# Patient Record
Sex: FEMALE | Race: WHITE | Employment: PART TIME | URBAN - METROPOLITAN AREA
[De-identification: names, ages, dates, MRNs, and addresses within clinical notes are randomized per-mention and may not be internally consistent; named-entity substitution may affect disease eponyms.]

---

## 2022-02-12 NOTE — PROGRESS NOTES
Subjective:     Chief Complaint   Patient presents with   Hannah larkin a 24 y.o. F.    This very pleasant 41-year-old white female with past medical history of anxiety and depression presents for establishment and an acute complaint of myalgias and generalized fatigue. She says that she had been in her usual state of health until approximately 2 months ago when she started feeling increasingly tired throughout the day, as well as having diffuse body aches. She says that she feels as if she had just worked out, when in fact she has not exercised as much as usual. She also complains of daytime lethargy, despite sleeping 8 hours per night. She says that she \"sleeps deeply\", but nevertheless sleep remains nonrestorative. Denies any snoring, and says that her partner has not noticed any apneic episodes, although she relates that her father has some form of sleep apnea. She relates to depressed mood but says that this feels different than her usual depression, particularly as she has just graduated from college and is waiting to start graduate school. She denies any fevers or chills, and denies any change in her bowel movements, no chest pain or shortness of breath, and no change in her exercise tolerance. She relates to easy fatigability with strenuous physical activity. She denies any suicidal or homicidal ideation but does relate to depressed mood and difficulty with concentration. She says that she falls asleep quite easily, however. She denies any insomnia. She denies any focal weakness, or difficulty getting out of a chair, and denies any change in her urine appearance. She does not smoke, alcohol only socially. She does not take any prescription medications. She denies any recreational drug use or herbal medications. She is sexually active with a male partner, and they use condoms.  Her last menstrual period was approximately 1 month ago, and she says that she typically has a high degree of variability in this. She also notes a prior history of an ovarian cyst, of undetermined clinical significance. She denies any joint pain or swelling. Her review of systems is otherwise unremarkable. Past Medical History:  Past Medical History:   Diagnosis Date    Depression     02/2022: therapy    Fatigue 02/2022    Former smoker, stopped smoking in distant past     Myalgia 02/2022    Ovarian cyst        Past Surgical Histor:  Past Surgical History:   Procedure Laterality Date    HX WISDOM TEETH EXTRACTION         Allergies:  No Known Allergies    Medications:      Social History:  Social History     Socioeconomic History    Marital status: LIFE PARTNER   Tobacco Use    Smoking status: Former Smoker    Smokeless tobacco: Former User   Substance and Sexual Activity    Alcohol use: Yes     Comment: socially    Drug use: Not Currently    Sexual activity: Yes       Family History:  Family History   Problem Relation Age of Onset    Hypertension Mother     Sleep Apnea Father     Kidney cancer Maternal Grandmother     Diabetes Maternal Grandfather        Immunizations:  Immunization History   Administered Date(s) Administered    COVID-19, Moderna Booster, PF, 0.25mL Dose 11/16/2021    COVID-19, Ladene Tamiko, Primary or Immunocompromised Series, MRNA, PF, 100mcg/0.5mL 04/06/2021, 05/06/2021    Influenza Vaccine 10/15/2021    Tdap 02/23/2020        Healthcare Maintenance:  Health Maintenance   Topic Date Due    Hepatitis C Screening  Never done    Depression Screen  Never done    HPV Age 9Y-34Y (1 - 2-dose series) Never done    Pap Smear  Never done    DTaP/Tdap/Td series (2 - Td or Tdap) 02/23/2030    Flu Vaccine  Completed    COVID-19 Vaccine  Completed    Pneumococcal 0-64 years  Aged Out        Review of Systems:  ROS:  Review of Systems   Constitutional: Positive for malaise/fatigue. Negative for chills, diaphoresis, fever and weight loss. HENT: Negative. Eyes: Negative. Respiratory: Negative. Negative for cough and wheezing. Cardiovascular: Negative. Negative for chest pain, palpitations, orthopnea, claudication, leg swelling and PND. Gastrointestinal: Negative. Genitourinary: Negative. Musculoskeletal: Positive for back pain and myalgias. Negative for falls, joint pain and neck pain. Skin: Negative. Neurological: Negative. Endo/Heme/Allergies: Negative. Psychiatric/Behavioral: Negative. ROS otherwise negative      Objective:     Vital Signs:  Visit Vitals  /80 (BP 1 Location: Left upper arm, BP Patient Position: Sitting, BP Cuff Size: Adult)   Pulse 95   Temp 99.2 °F (37.3 °C) (Oral)   Ht 5' 5\" (1.651 m)   Wt 155 lb 3.2 oz (70.4 kg)   SpO2 99%   BMI 25.83 kg/m²       BMI:  Body mass index is 25.83 kg/m². Physical Examination:  Physical Exam  Constitutional:       Appearance: Normal appearance. She is normal weight. HENT:      Head: Normocephalic and atraumatic. Right Ear: External ear normal.      Left Ear: External ear normal.      Nose: Nose normal.      Mouth/Throat:      Mouth: Mucous membranes are moist.      Pharynx: Oropharynx is clear. No oropharyngeal exudate or posterior oropharyngeal erythema. Eyes:      Extraocular Movements: Extraocular movements intact. Pupils: Pupils are equal, round, and reactive to light. Cardiovascular:      Rate and Rhythm: Normal rate and regular rhythm. Pulses: Normal pulses. Heart sounds: Normal heart sounds. No murmur heard. No friction rub. No gallop. Pulmonary:      Effort: Pulmonary effort is normal. No respiratory distress. Breath sounds: Normal breath sounds. No wheezing, rhonchi or rales. Abdominal:      General: Abdomen is flat. Bowel sounds are normal. There is no distension. Palpations: Abdomen is soft. Tenderness: There is no abdominal tenderness. There is no guarding. Musculoskeletal:         General: No swelling, tenderness or deformity. Normal range of motion. Cervical back: Normal range of motion and neck supple. No rigidity or tenderness. Comments: Numerous trigger points positive   Skin:     General: Skin is warm and dry. Findings: No erythema, lesion or rash. Neurological:      General: No focal deficit present. Mental Status: She is alert and oriented to person, place, and time. Mental status is at baseline. Sensory: No sensory deficit. Motor: No weakness. Gait: Gait normal.      Deep Tendon Reflexes: Reflexes normal.   Psychiatric:         Mood and Affect: Mood normal.         Behavior: Behavior normal.         Judgment: Judgment normal.          Physical exam otherwise negative    Diagnostic Testing:    Laboratory Studies:  No results found for any previous visit. Radiographic Studies:  XR Results (most recent):  No results found for this or any previous visit. CARLOS Results (most recent):  No results found for this or any previous visit. CT Results (most recent):  No results found for this or any previous visit. DEXA Results (most recent):  No results found for this or any previous visit. MRI Results (most recent):  No results found for this or any previous visit. Assessment/Plan:       ICD-10-CM ICD-9-CM    1. Routine adult health maintenance  Z00.00 V70.0 HEPATITIS C AB      HEMOGLOBIN A1C WITH EAG      CBC WITH AUTOMATED DIFF      METABOLIC PANEL, COMPREHENSIVE      LIPID PANEL      HIV 1/2 AG/AB, 4TH GENERATION,W RFLX CONFIRM      TSH 3RD GENERATION      VITAMIN D, 25 HYDROXY      VITAMIN B12      CK      SED RATE (ESR)      HCG URINE, QL   2. Screening for viral disease  Z11.59 V73.99 HEPATITIS C AB      HIV 1/2 AG/AB, 4TH GENERATION,W RFLX CONFIRM   3. Myalgia  M79.10 729.1 TSH 3RD GENERATION      VITAMIN D, 25 HYDROXY      VITAMIN B12      CK      SED RATE (ESR)      HCG URINE, QL   4. Depression, unspecified depression type  F32. A 311    5.  Fatigue, unspecified type R53.83 780.79 TSH 3RD GENERATION      VITAMIN D, 25 HYDROXY      VITAMIN B12      CK      SED RATE (ESR)      HCG URINE, QL          This young white female presents with complaints of fatigue and myalgias. There is differential diagnosis for this, but given the presence of positive trigger points on examination and her history of depression/anxiety, I favor fibromyalgia. Less likely would be the possibility of a primary hematologic disorder including anemia, and much less likely leukemia given the lack of systemic symptoms; I also am doubtful for endocrinopathies including hypothyroidism or adrenal insufficiency. Primary rheumatologic disease including rheumatoid arthritis or lupus strike me as incredibly unlikely given the lack of joint symptoms and other associated symptoms. Myositis a consideration though would not be typically associated with myalgias. HIV and other infectious etiologies also strike me as unlikely but could be present and cause these symptoms. Primary cardiovascular disorder also strikes me as unlikely given no change in exercise tolerance and no other focal findings on examination. Certainly, poor sleep from untreated obstructive sleep apnea or other primes order would also be a consideration. I discussed this differential diagnosis with the patient. I also noted to her that if fibromyalgia was in fact the diagnosis then treatment with Cymbalta could be considered, which would have some salutary effect on depression. She will continue with therapy for now, and we will revisit this potential treatment if diagnostic testing ends up being unremarkable. Laboratory studies ordered as noted above. Patient will follow up with me in no more than 3 months for follow-up on her symptoms, and potentially follow-up initiation of pharmacotherapy. Consider referral to rheumatology, sleep medicine, holding off for now.     Patient reports being up-to-date with regard to Pap smear as of a few months ago in 2021, and being up-to-date with regard to Covid vaccination and booster, Tdap, and Gardasil. Reed Winslow MD    Please note that this dictation was completed with [employer], the computer voice recognition software. Quite often unanticipated grammatical, syntax, homophones, and other interpretive errors are inadvertently transcribed by the computer software. Please disregard these errors. Please excuse any errors that have escaped final proofreading.

## 2022-02-23 ENCOUNTER — OFFICE VISIT (OUTPATIENT)
Dept: INTERNAL MEDICINE CLINIC | Age: 22
End: 2022-02-23
Payer: COMMERCIAL

## 2022-02-23 VITALS
OXYGEN SATURATION: 99 % | HEIGHT: 65 IN | TEMPERATURE: 99.2 F | DIASTOLIC BLOOD PRESSURE: 80 MMHG | BODY MASS INDEX: 25.86 KG/M2 | HEART RATE: 95 BPM | WEIGHT: 155.2 LBS | SYSTOLIC BLOOD PRESSURE: 124 MMHG

## 2022-02-23 DIAGNOSIS — Z11.59 SCREENING FOR VIRAL DISEASE: ICD-10-CM

## 2022-02-23 DIAGNOSIS — F32.A DEPRESSION, UNSPECIFIED DEPRESSION TYPE: ICD-10-CM

## 2022-02-23 DIAGNOSIS — Z00.00 ROUTINE ADULT HEALTH MAINTENANCE: Primary | ICD-10-CM

## 2022-02-23 DIAGNOSIS — M79.10 MYALGIA: ICD-10-CM

## 2022-02-23 DIAGNOSIS — R53.83 FATIGUE, UNSPECIFIED TYPE: ICD-10-CM

## 2022-02-23 PROCEDURE — 99204 OFFICE O/P NEW MOD 45 MIN: CPT | Performed by: INTERNAL MEDICINE

## 2022-02-23 NOTE — PATIENT INSTRUCTIONS
Learning About the [facility] Diet  What is the Mediterranean diet? The Mediterranean diet is a style of eating rather than a diet plan. It features foods eaten in [place], [place], [place] and [place], and other countries along the [facility]. It emphasizes eating foods like fish, fruits, vegetables, beans, high-fiber breads and whole grains, nuts, and olive oil. This style of eating includes limited red meat, cheese, and sweets. Why choose the Mediterranean diet? A Mediterranean-style diet may improve heart health. It contains more fat than other heart-healthy diets. But the fats are mainly from nuts, unsaturated oils (such as fish oils and olive oil), and certain nut or seed oils (such as canola, soybean, or flaxseed oil). These fats may help protect the heart and blood vessels. How can you get started on the Mediterranean diet? Here are some things you can do to switch to a more Mediterranean way of eating. What to eat  · Eat a variety of fruits and vegetables each day, such as grapes, blueberries, tomatoes, broccoli, peppers, figs, olives, spinach, eggplant, beans, lentils, and chickpeas. · Eat a variety of whole-grain foods each day, such as oats, brown rice, and whole wheat bread, pasta, and couscous. · Eat fish at least 2 times a week. Try tuna, salmon, mackerel, lake trout, herring, or sardines. · Eat moderate amounts of low-fat dairy products, such as milk, cheese, or yogurt. · Eat moderate amounts of poultry and eggs. · Choose healthy (unsaturated) fats, such as nuts, olive oil, and certain nut or seed oils like canola, soybean, and flaxseed. · Limit unhealthy (saturated) fats, such as butter, palm oil, and coconut oil. And limit fats found in animal products, such as meat and dairy products made with whole milk. Try to eat red meat only a few times a month in very small amounts. · Limit sweets and desserts to only a few times a week.  This includes sugar-sweetened drinks like soda. The Mediterranean diet may also include red wine with your meal1 glass each day for women and up to 2 glasses a day for men. Tips for eating at home  · Use herbs, spices, garlic, lemon zest, and citrus juice instead of salt to add flavor to foods. · Add avocado slices to your sandwich instead of henry. · Have fish for lunch or dinner instead of red meat. Brush the fish with olive oil, and broil or grill it. · Sprinkle your salad with seeds or nuts instead of cheese. · Cook with olive or canola oil instead of butter or oils that are high in saturated fat. · Switch from 2% milk or whole milk to 1% or fat-free milk. · Dip raw vegetables in a vinaigrette dressing or hummus instead of dips made from mayonnaise or sour cream.  · Have a piece of fruit for dessert instead of a piece of cake. Try baked apples, or have some dried fruit. Tips for eating out  · Try broiled, grilled, baked, or poached fish instead of having it fried or breaded. · Ask your  to have your meals prepared with olive oil instead of butter. · Order dishes made with marinara sauce or sauces made from olive oil. Avoid sauces made from cream or mayonnaise. · Choose whole-grain breads, whole wheat pasta and pizza crust, brown rice, beans, and lentils. · Cut back on butter or margarine on bread. Instead, you can dip your bread in a small amount of olive oil. · Ask for a side salad or grilled vegetables instead of french fries or chips. Where can you learn more? Go to http://www.ratliff.com/  Enter O407 in the search box to learn more about \"Learning About the Mediterranean Diet. \"  Current as of: December 17, 2020               Content Version: 13.0  © 2079-3918 [employer] Inc[employer]. Care instructions adapted under license by "[employer]" (which disclaims liability or warranty for this information).  If you have questions about a medical condition or this instruction, always ask your healthcare professional. [address] any warranty or liability for your use of this information.

## 2022-02-23 NOTE — PROGRESS NOTES
Chief Complaint   Patient presents with    Establish Care     Visit Vitals  /80 (BP 1 Location: Left upper arm, BP Patient Position: Sitting, BP Cuff Size: Adult)   Pulse 95   Temp 99.2 °F (37.3 °C) (Oral)   Ht 5' 5\" (1.651 m)   Wt 155 lb 3.2 oz (70.4 kg)   SpO2 99%   BMI 25.83 kg/m²         1. Have you been to the ER, urgent care clinic since your last visit? Hospitalized since your last visit? No    2. Have you seen or consulted any other health care providers outside of the [address] [place] [address] since your last visit? Include any pap smears or colon screening.  No

## 2022-03-02 ENCOUNTER — APPOINTMENT (OUTPATIENT)
Dept: INTERNAL MEDICINE CLINIC | Age: 22
End: 2022-03-02

## 2022-03-02 DIAGNOSIS — Z11.59 SCREENING FOR VIRAL DISEASE: ICD-10-CM

## 2022-03-02 DIAGNOSIS — M79.10 MYALGIA: ICD-10-CM

## 2022-03-02 DIAGNOSIS — Z00.00 ROUTINE ADULT HEALTH MAINTENANCE: ICD-10-CM

## 2022-03-02 DIAGNOSIS — R53.83 FATIGUE, UNSPECIFIED TYPE: ICD-10-CM

## 2022-03-02 LAB
25(OH)D3 SERPL-MCNC: 24.1 NG/ML (ref 30–100)
ALBUMIN SERPL-MCNC: 4.3 G/DL (ref 3.5–5)
ALBUMIN/GLOB SERPL: 1.3 {RATIO} (ref 1.1–2.2)
ALP SERPL-CCNC: 83 U/L (ref 45–117)
ALT SERPL-CCNC: 23 U/L (ref 12–78)
ANION GAP SERPL CALC-SCNC: 4 MMOL/L (ref 5–15)
AST SERPL-CCNC: 24 U/L (ref 15–37)
BASOPHILS # BLD: 0 K/UL (ref 0–0.1)
BASOPHILS NFR BLD: 1 % (ref 0–1)
BILIRUB SERPL-MCNC: 0.6 MG/DL (ref 0.2–1)
BUN SERPL-MCNC: 10 MG/DL (ref 6–20)
BUN/CREAT SERPL: 15 (ref 12–20)
CALCIUM SERPL-MCNC: 9.2 MG/DL (ref 8.5–10.1)
CHLORIDE SERPL-SCNC: 104 MMOL/L (ref 97–108)
CHOLEST SERPL-MCNC: 201 MG/DL
CK SERPL-CCNC: 134 U/L (ref 26–192)
CO2 SERPL-SCNC: 29 MMOL/L (ref 21–32)
CREAT SERPL-MCNC: 0.67 MG/DL (ref 0.55–1.02)
DIFFERENTIAL METHOD BLD: NORMAL
EOSINOPHIL # BLD: 0.1 K/UL (ref 0–0.4)
EOSINOPHIL NFR BLD: 2 % (ref 0–7)
ERYTHROCYTE [DISTWIDTH] IN BLOOD BY AUTOMATED COUNT: 13.2 % (ref 11.5–14.5)
ERYTHROCYTE [SEDIMENTATION RATE] IN BLOOD: 7 MM/HR (ref 0–20)
EST. AVERAGE GLUCOSE BLD GHB EST-MCNC: 85 MG/DL
GLOBULIN SER CALC-MCNC: 3.3 G/DL (ref 2–4)
GLUCOSE SERPL-MCNC: 79 MG/DL (ref 65–100)
HBA1C MFR BLD: 4.6 % (ref 4–5.6)
HCG UR QL: NEGATIVE
HCT VFR BLD AUTO: 42.2 % (ref 35–47)
HCV AB SERPL QL IA: NONREACTIVE
HDLC SERPL-MCNC: 91 MG/DL
HDLC SERPL: 2.2 {RATIO} (ref 0–5)
HGB BLD-MCNC: 13.7 G/DL (ref 11.5–16)
HIV 1+2 AB+HIV1 P24 AG SERPL QL IA: NONREACTIVE
HIV12 RESULT COMMENT, HHIVC: NORMAL
IMM GRANULOCYTES # BLD AUTO: 0 K/UL (ref 0–0.04)
IMM GRANULOCYTES NFR BLD AUTO: 0 % (ref 0–0.5)
LDLC SERPL CALC-MCNC: 101.2 MG/DL (ref 0–100)
LYMPHOCYTES # BLD: 1.6 K/UL (ref 0.8–3.5)
LYMPHOCYTES NFR BLD: 33 % (ref 12–49)
MCH RBC QN AUTO: 30.2 PG (ref 26–34)
MCHC RBC AUTO-ENTMCNC: 32.5 G/DL (ref 30–36.5)
MCV RBC AUTO: 93.2 FL (ref 80–99)
MONOCYTES # BLD: 0.6 K/UL (ref 0–1)
MONOCYTES NFR BLD: 11 % (ref 5–13)
NEUTS SEG # BLD: 2.6 K/UL (ref 1.8–8)
NEUTS SEG NFR BLD: 53 % (ref 32–75)
NRBC # BLD: 0 K/UL (ref 0–0.01)
NRBC BLD-RTO: 0 PER 100 WBC
PLATELET # BLD AUTO: 329 K/UL (ref 150–400)
PMV BLD AUTO: 10.2 FL (ref 8.9–12.9)
POTASSIUM SERPL-SCNC: 4.2 MMOL/L (ref 3.5–5.1)
PROT SERPL-MCNC: 7.6 G/DL (ref 6.4–8.2)
RBC # BLD AUTO: 4.53 M/UL (ref 3.8–5.2)
SODIUM SERPL-SCNC: 137 MMOL/L (ref 136–145)
TRIGL SERPL-MCNC: 44 MG/DL (ref ?–150)
TSH SERPL DL<=0.05 MIU/L-ACNC: 1.37 UIU/ML (ref 0.36–3.74)
VIT B12 SERPL-MCNC: 528 PG/ML (ref 193–986)
VLDLC SERPL CALC-MCNC: 8.8 MG/DL
WBC # BLD AUTO: 5 K/UL (ref 3.6–11)

## 2022-03-03 ENCOUNTER — PATIENT MESSAGE (OUTPATIENT)
Dept: INTERNAL MEDICINE CLINIC | Age: 22
End: 2022-03-03

## 2022-03-03 DIAGNOSIS — F32.A DEPRESSION, UNSPECIFIED DEPRESSION TYPE: ICD-10-CM

## 2022-03-03 DIAGNOSIS — M79.10 MYALGIA: Primary | ICD-10-CM

## 2022-03-03 NOTE — PROGRESS NOTES
Please let this patient know that her labs were generally unremarkable except for her vitamin D level which was slightly low. She can begin over-the-counter supplementation with 1000 to 2000 international units daily.

## 2022-03-07 RX ORDER — DULOXETIN HYDROCHLORIDE 30 MG/1
30 CAPSULE, DELAYED RELEASE ORAL DAILY
Qty: 30 CAPSULE | Refills: 5 | Status: SHIPPED | OUTPATIENT
Start: 2022-03-07 | End: 2022-04-27 | Stop reason: DRUGHIGH

## 2022-03-07 NOTE — TELEPHONE ENCOUNTER
Per discussion, trial low-dose Cymbalta; if tolerated, consider titration to 60 mg/d. Patient has not specified a pharmacy. E-prescription not possible. Paper script provided. Please have patient stop by clinic to pick this up or advise as to which pharmacy she would like this sent to. Thanks!

## 2022-03-07 NOTE — TELEPHONE ENCOUNTER
From: Nallely Laboy  To: [facility]  Sent: 3/3/2022 9:54 AM EST  Subject: Starting Cymbalta (RX request and question)    After receiving no significant lab results, besides slightly low vitamin D, I was wondering if I could possibly begin a regiment for Cymbalta as suggested by Dr. Ham Cates? Also, I wanted to know if it was worth following up with a sleep study in regards to issues raised during my last appt. ?     Thank you,  Erminia Opitz

## 2022-03-07 NOTE — TELEPHONE ENCOUNTER
Paper prescription provided/printed; please have patient  or advise as to pharmacy she would like this sent to.

## 2022-03-19 PROBLEM — R53.83 FATIGUE: Status: ACTIVE | Noted: 2022-02-01

## 2022-03-20 PROBLEM — M79.10 MYALGIA: Status: ACTIVE | Noted: 2022-02-01

## 2022-04-26 ENCOUNTER — PATIENT MESSAGE (OUTPATIENT)
Dept: INTERNAL MEDICINE CLINIC | Age: 22
End: 2022-04-26

## 2022-04-26 DIAGNOSIS — F32.A DEPRESSION, UNSPECIFIED DEPRESSION TYPE: Primary | ICD-10-CM

## 2022-04-27 RX ORDER — DULOXETIN HYDROCHLORIDE 60 MG/1
60 CAPSULE, DELAYED RELEASE ORAL DAILY
Qty: 90 CAPSULE | Refills: 3 | Status: SHIPPED | OUTPATIENT
Start: 2022-04-27 | End: 2022-05-03 | Stop reason: SDUPTHER

## 2022-04-27 NOTE — TELEPHONE ENCOUNTER
Micah Lees Dimitri Deanna,    I agree with you that the 30 mg dose may not be sufficient. We often have to use the 60 mg/d dosing. You can double up on the prescription for now; I will order the higher dose for you. Please continue to monitor for side effects-- though I'm glad to hear it was effective. Please make sure you have a followup set up with me; I look forward to seeing you again in clinic soon! You can  the printed prescription from the  at our clinic. Dr. Nunu Simon ICD-9-CM    1. Depression, unspecified depression type  F32. A 311 DULoxetine (CYMBALTA) 60 mg capsule

## 2022-04-27 NOTE — TELEPHONE ENCOUNTER
From: Eugene Romero  To: [place] [facility]  Sent: 4/26/2022 9:19 PM EDT  Subject: Increase Cymbalta Dose    Good Afternoon,    The 30mg Cymbalta dose was working great up until two weeks ago. However, the previous tiredness and depressive symptoms treated with the Cymbalta have returned. I was wondering if a change in dose would be effective in treating this setback? Thank you!   Caroline Mercado

## 2022-05-03 ENCOUNTER — PATIENT MESSAGE (OUTPATIENT)
Dept: INTERNAL MEDICINE CLINIC | Age: 22
End: 2022-05-03

## 2022-05-03 DIAGNOSIS — F32.A DEPRESSION, UNSPECIFIED DEPRESSION TYPE: ICD-10-CM

## 2022-05-03 RX ORDER — DULOXETIN HYDROCHLORIDE 60 MG/1
60 CAPSULE, DELAYED RELEASE ORAL DAILY
Qty: 90 CAPSULE | Refills: 3 | Status: SHIPPED
Start: 2022-05-03 | End: 2022-09-15 | Stop reason: DRUGHIGH

## 2022-05-03 NOTE — TELEPHONE ENCOUNTER
From: Katie Jose  To: [facility]  Sent: 5/3/2022 1:47 PM EDT  Subject: Refill Prescription    Can I get my new prescription dosage sent/filled at the [address] [facility], [id number]? Thank you!

## 2022-05-12 PROBLEM — M79.7 FIBROMYALGIA: Status: ACTIVE | Noted: 2022-02-01

## 2022-05-12 NOTE — PROGRESS NOTES
Subjective:     Chief Complaint   Patient presents with   Thai Rhoades is a 24 y.o. F.  She is a former smoker and has a past medical history of depression. I had seen the patient most recently in late February for establishment. She had been complaining of feeling increasingly tired throughout the day as well as diffuse body aches. She had related to also depressed mood but said that this felt different than her usual depression. Physical examination at the time was unremarkable. Trigger points were noted on exam.  I had favored the likelihood of fibromyalgia as a diagnosis. I had referred her for routine laboratory testing and started her on a trial of Cymbalta. Today, the patient comes in for routine follow-up. She reports feeling fine overall today except for a head cold, which she has had for about the past 2 to 3 days. She has been using DayQuil for this, although it has not been very helpful. She says that she was tested for COVID at home 3 times, with the tests all being negative. She denies any chest pain, shortness of breath, wheezing, hemoptysis, coughing, or other problems. Her main problems are nasal congestion with postnasal drip, and a low-grade temperature. Denies any significant sinus tenderness or pain. She says that she has quit drinking, and says that overall the duloxetine that is now prescribed at 60 mg daily has been very effective for her. She denies any suicidal homicidal ideation and feels that her mood is significantly improved. She no longer gets as low as she admitted before. She is going to therapy currently, and plans to continue with this. Overall, she feels that her mood and fibromyalgia are much better controlled. She denies any worsening myalgias or other pain symptoms. Her energy level is now back to normal.    Otherwise, she reports feeling fine. She says that she last had her Pap smear done last year around this time.   Her review of systems is otherwise negative. Physical examination is notable for bilateral boggy nares, as well as clear nasal discharge. There is mild submandibular gland enlargement. There is mild oropharyngeal cobblestoning. Lungs are clear to auscultation bilateral.  There is no lower extremity edema. Heart sounds are tachycardic. Past Medical History:  Past Medical History:   Diagnosis Date    Depression     02/2022: therapy    Fatigue 02/2022    Fibromyalgia 02/2022    Former smoker, stopped smoking in distant past     Hyperlipidemia 03/2022    diet controlled, .2 mg/dL    Ovarian cyst     Vitamin D deficiency        Past Surgical Histor:  Past Surgical History:   Procedure Laterality Date    HX WISDOM TEETH EXTRACTION         Allergies:  No Known Allergies    Medications:  Current Outpatient Medications   Medication Sig Dispense Refill    DULoxetine (CYMBALTA) 60 mg capsule Take 1 Capsule by mouth daily for 360 days.  90 Capsule 3       Social History:  Social History     Socioeconomic History    Marital status: LIFE PARTNER   Tobacco Use    Smoking status: Former Smoker    Smokeless tobacco: Former User   Substance and Sexual Activity    Alcohol use: Yes     Comment: socially    Drug use: Not Currently    Sexual activity: Yes       Family History:  Family History   Problem Relation Age of Onset    Hypertension Mother     Sleep Apnea Father     Kidney cancer Maternal Grandmother     Diabetes Maternal Grandfather        Immunizations:  Immunization History   Administered Date(s) Administered    COVID-19, Moderna Booster, PF, 0.25mL Dose 11/16/2021    COVID-19, [place], Primary or Immunocompromised Series, MRNA, PF, 100mcg/0.5mL 04/06/2021, 05/06/2021    Influenza Vaccine 10/15/2021    Tdap 02/23/2020        Healthcare Maintenance:  Health Maintenance   Topic Date Due    HPV Age 9Y-34Y (1 - 2-dose series) Never done    Depression Monitoring  Never done    Pap Smear  05/01/2024  DTaP/Tdap/Td series (2 - Td or Tdap) 02/23/2030    Hepatitis C Screening  Completed    Flu Vaccine  Completed    COVID-19 Vaccine  Completed    Pneumococcal 0-64 years  Aged Out        Review of Systems:  ROS:  Review of Systems   Constitutional: Positive for fever. Negative for chills, diaphoresis, malaise/fatigue and weight loss. HENT: Positive for congestion. Negative for sinus pain and sore throat. Eyes: Negative. Respiratory: Negative. Negative for cough, hemoptysis, sputum production and shortness of breath. Cardiovascular: Negative. Negative for chest pain, palpitations, orthopnea, claudication, leg swelling and PND. Gastrointestinal: Negative. Genitourinary: Negative. Musculoskeletal: Negative. Skin: Negative. Neurological: Negative. Endo/Heme/Allergies: Negative. Psychiatric/Behavioral: Negative. ROS otherwise negative      Objective:     Vital Signs:  Visit Vitals  BP (!) 144/99 (BP 1 Location: Left upper arm, BP Patient Position: Sitting, BP Cuff Size: Adult)   Pulse (!) 121   Temp 99.3 °F (37.4 °C) (Oral)   Ht 5' 5\" (1.651 m)   Wt 158 lb 11.2 oz (72 kg)   SpO2 98%   BMI 26.41 kg/m²       BMI:  Body mass index is 26.41 kg/m². Physical Examination:  Physical Exam  Constitutional:       Appearance: Normal appearance. She is normal weight. HENT:      Head: Normocephalic and atraumatic. Right Ear: External ear normal.      Left Ear: External ear normal.      Nose: Congestion and rhinorrhea present. Mouth/Throat:      Mouth: Mucous membranes are moist.      Pharynx: Oropharynx is clear. No oropharyngeal exudate or posterior oropharyngeal erythema. Cardiovascular:      Rate and Rhythm: Regular rhythm. Tachycardia present. Pulses: Normal pulses. Heart sounds: Normal heart sounds. No murmur heard. No friction rub. No gallop. Pulmonary:      Effort: Pulmonary effort is normal. No respiratory distress.       Breath sounds: Normal breath sounds. No wheezing, rhonchi or rales. Abdominal:      General: Abdomen is flat. Bowel sounds are normal. There is no distension. Palpations: Abdomen is soft. Tenderness: There is no abdominal tenderness. There is no guarding. Musculoskeletal:         General: No swelling, tenderness or deformity. Normal range of motion. Cervical back: Normal range of motion and neck supple. No rigidity or tenderness. Skin:     General: Skin is warm and dry. Findings: No erythema, lesion or rash. Neurological:      General: No focal deficit present. Mental Status: She is alert and oriented to person, place, and time. Mental status is at baseline. Sensory: No sensory deficit. Motor: No weakness. Gait: Gait normal.      Deep Tendon Reflexes: Reflexes normal.   Psychiatric:         Mood and Affect: Mood normal.         Behavior: Behavior normal.         Judgment: Judgment normal.          Physical exam otherwise negative    Diagnostic Testing:    Laboratory Studies:  Appointment on 03/02/2022   Component Date Value Ref Range Status    HCG urine, QL 03/02/2022 Negative  Negative   Final    Sed rate, automated 03/02/2022 7  0 - 20 mm/hr Final    CK 03/02/2022 134  26 - 192 U/L Final    Vitamin B12 03/02/2022 528  193 - 986 pg/mL Final    Vitamin D 25-Hydroxy 03/02/2022 24.1* 30 - 100 ng/mL Final    Comment: (NOTE)  Deficiency               <20 ng/mL  Insufficiency          20-30 ng/mL  Sufficient             ng/mL  Possible toxicity       >100 ng/mL    The Method used is Siemens Advia Centaur currently standardized to a   Center of Disease Control and Prevention (CDC) certified reference   [facility]. Samples containing fluorescein dye can produce falsely   elevated values when tested with the ADVIA Centaur Vitamin D Assay. It is recommended that results in the toxic range, >100 ng/mL, be   retested 72 hours post fluorescein exposure.       TSH 03/02/2022 1.37  0.36 - 3.74 uIU/mL Final    Comment:   Due to TSH heterogeneity, both structurally and degree of glycosylation,  monoclonal antibodies used in the TSH assay may not accurately quantitate TSH. Therefore, this result should be correlated with clinical findings as well as  with other assessments of thyroid function, e.g., free T4, free T3.      HIV 1/2 Interpretation 03/02/2022 NONREACTIVE  NONREACTIVE   Final    HIV 1/2 result comment 03/02/2022 SEE NOTE    Final    Comment: While this assay is highly sensitive, a non-reactive/negative result for HIV  antibodies HIV-1 and HIV-2 and p24 antigen, does not exclude the possibility of  exposure to or infection with HIV. HIV antibodies and/or p24 antigen may be  undetectable in some stages of the infection and in some clinical conditions. Test performed by the iCetanaaur HIV Ag/Ab Combo (CHIV), 4th generation  assay. Recommend consulting relevant CDC guidelines for informing test subject  of the result and its interpretation.  Cholesterol, total 03/02/2022 201* <200 MG/DL Final    Triglyceride 03/02/2022 44  <150 MG/DL Final    Comment: Based on NCEP-ATP III:  Triglycerides <150 mg/dL  is considered normal, 150-199  mg/dL  borderline high,  200-499 mg/dL high and  greater than or equal to 500  mg/dL very high.  HDL Cholesterol 03/02/2022 91  MG/DL Final    Comment: Based on NCEP ATP III, HDL Cholesterol <40 mg/dL is considered low and >60  mg/dL is elevated.       LDL, calculated 03/02/2022 101.2* 0 - 100 MG/DL Final    Comment: Based on the NCEP-ATP: LDL-C concentrations are considered  optimal <100 mg/dL,  near optimal/above Normal 100-129 mg/dL Borderline High: 130-159, High: 160-189  mg/dL Very High: Greater than or equal to 190 mg/dL      VLDL, calculated 03/02/2022 8.8  MG/DL Final    CHOL/HDL Ratio 03/02/2022 2.2  0.0 - 5.0   Final    Sodium 03/02/2022 137  136 - 145 mmol/L Final    Potassium 03/02/2022 4.2  3.5 - 5.1 mmol/L Final    Chloride 03/02/2022 104  97 - 108 mmol/L Final    CO2 03/02/2022 29  21 - 32 mmol/L Final    Anion gap 03/02/2022 4* 5 - 15 mmol/L Final    Glucose 03/02/2022 79  65 - 100 mg/dL Final    BUN 03/02/2022 10  6 - 20 MG/DL Final    Creatinine 03/02/2022 0.67  0.55 - 1.02 MG/DL Final    BUN/Creatinine ratio 03/02/2022 15  12 - 20   Final    GFR est AA 03/02/2022 >60  >60 ml/min/1.73m2 Final    GFR est non-AA 03/02/2022 >60  >60 ml/min/1.73m2 Final    Comment: Estimated GFR is calculated using the IDMS-traceable Modification of Diet in  Renal Disease (MDRD) Study equation, reported for both  Americans  (GFRAA) and non- Americans (GFRNA), and normalized to 1.73m2 body  surface area. The physician must decide which value applies to the patient.  Calcium 03/02/2022 9.2  8.5 - 10.1 MG/DL Final    Bilirubin, total 03/02/2022 0.6  0.2 - 1.0 MG/DL Final    ALT (SGPT) 03/02/2022 23  12 - 78 U/L Final    AST (SGOT) 03/02/2022 24  15 - 37 U/L Final    Alk.  phosphatase 03/02/2022 83  45 - 117 U/L Final    Protein, total 03/02/2022 7.6  6.4 - 8.2 g/dL Final    Albumin 03/02/2022 4.3  3.5 - 5.0 g/dL Final    Globulin 03/02/2022 3.3  2.0 - 4.0 g/dL Final    A-G Ratio 03/02/2022 1.3  1.1 - 2.2   Final    WBC 03/02/2022 5.0  3.6 - 11.0 K/uL Final    RBC 03/02/2022 4.53  3.80 - 5.20 M/uL Final    HGB 03/02/2022 13.7  11.5 - 16.0 g/dL Final    HCT 03/02/2022 42.2  35.0 - 47.0 % Final    MCV 03/02/2022 93.2  80.0 - 99.0 FL Final    MCH 03/02/2022 30.2  26.0 - 34.0 PG Final    MCHC 03/02/2022 32.5  30.0 - 36.5 g/dL Final    RDW 03/02/2022 13.2  11.5 - 14.5 % Final    PLATELET 33/57/5585 990  150 - 400 K/uL Final    MPV 03/02/2022 10.2  8.9 - 12.9 FL Final    NRBC 03/02/2022 0.0  0  WBC Final    ABSOLUTE NRBC 03/02/2022 0.00  0.00 - 0.01 K/uL Final    NEUTROPHILS 03/02/2022 53  32 - 75 % Final    LYMPHOCYTES 03/02/2022 33  12 - 49 % Final    MONOCYTES 03/02/2022 11  5 - 13 % Final    EOSINOPHILS 03/02/2022 2  0 - 7 % Final    BASOPHILS 03/02/2022 1  0 - 1 % Final    IMMATURE GRANULOCYTES 03/02/2022 0  0.0 - 0.5 % Final    ABS. NEUTROPHILS 03/02/2022 2.6  1.8 - 8.0 K/UL Final    ABS. LYMPHOCYTES 03/02/2022 1.6  0.8 - 3.5 K/UL Final    ABS. MONOCYTES 03/02/2022 0.6  0.0 - 1.0 K/UL Final    ABS. EOSINOPHILS 03/02/2022 0.1  0.0 - 0.4 K/UL Final    ABS. BASOPHILS 03/02/2022 0.0  0.0 - 0.1 K/UL Final    ABS. IMM. GRANS. 03/02/2022 0.0  0.00 - 0.04 K/UL Final    DF 03/02/2022 AUTOMATED    Final    Hemoglobin A1c 03/02/2022 4.6  4.0 - 5.6 % Final    Comment: NEW METHOD PLEASE NOTE NEW REFERENCE RANGE  (NOTE)  HbA1C Interpretive Ranges  <5.7              Normal  5.7 - 6.4         Consider Prediabetes  >6.5              Consider Diabetes      Est. average glucose 03/02/2022 85  mg/dL Final    Hep C virus Ab Interp. 03/02/2022 NONREACTIVE  NONREACTIVE   Final    Method used is Siemens Advia Centaur         Radiographic Studies:  XR Results (most recent):  No results found for this or any previous visit. CARLOS Results (most recent):  No results found for this or any previous visit. CT Results (most recent):  No results found for this or any previous visit. DEXA Results (most recent):  No results found for this or any previous visit. MRI Results (most recent):  No results found for this or any previous visit. Assessment/Plan:       ICD-10-CM ICD-9-CM    1. Routine adult health maintenance  Z00.00 V70.0    2. Depression, unspecified depression type  F32. A 311    3. Fibromyalgia  M79.7 729.1    4. Viral upper respiratory tract infection  J06.9 465.9    5. Tachycardia  R00.0 785.0    6. Elevated blood pressure reading without diagnosis of hypertension  R03.0 796.2           Healthcare Maintenance:  - Preventive measures are reviewed as per above  - Up to date on routine interventions except as noted above  - Orders placed to update gaps as noted  - Notes: Up-to-date with regard to routine screening. Continuing with current care. Anxiety/Depression:   - Current PHQ: No data recorded    - Current RX:   Key Psychotherapeutic Meds             DULoxetine (CYMBALTA) 60 mg capsule (Taking) Take 1 Capsule by mouth daily for 360 days. - Psychology/Psychiatry Co-managing? Yes   -Following with psychology for therapy. Tolerating current medication regimen; mood symptoms significantly improved. Continue with current care. Fibromyalgia:   - Overall, symptoms much improved with the use of Cymbalta 60 mg daily. Tolerating therapy. Continuing with current care. Viral URI:   - No evidence at this point to suggest bacterial infection. Discussed nasal saline rinses, Flonase. Handouts provided. Return precautions also provided. Patient endorses agreement and understanding. Tachycardia:   Suspect secondary to sympathomimetic nasal decongestant/DayQuil use. We discussed discontinuing this. She will monitor her heart rate at home. I do not suspect atrial fibrillation given the regularity on examination, as well as the patient's youth and lack of other comorbidities. Advised her to contact clinic should this persist after discontinuation of nasal decongestants. Follow-up and Dispositions    · Return in about 6 months (around 11/23/2022). Mikey Huntley MD    Please note that this dictation was completed with [employer], the computer voice recognition software. Quite often unanticipated grammatical, syntax, homophones, and other interpretive errors are inadvertently transcribed by the computer software. Please disregard these errors. Please excuse any errors that have escaped final proofreading.

## 2022-05-23 ENCOUNTER — OFFICE VISIT (OUTPATIENT)
Dept: INTERNAL MEDICINE CLINIC | Age: 22
End: 2022-05-23
Payer: COMMERCIAL

## 2022-05-23 VITALS
TEMPERATURE: 99.3 F | DIASTOLIC BLOOD PRESSURE: 99 MMHG | OXYGEN SATURATION: 98 % | HEART RATE: 121 BPM | WEIGHT: 158.7 LBS | BODY MASS INDEX: 26.44 KG/M2 | HEIGHT: 65 IN | SYSTOLIC BLOOD PRESSURE: 144 MMHG

## 2022-05-23 DIAGNOSIS — M79.7 FIBROMYALGIA: ICD-10-CM

## 2022-05-23 DIAGNOSIS — R03.0 ELEVATED BLOOD PRESSURE READING WITHOUT DIAGNOSIS OF HYPERTENSION: ICD-10-CM

## 2022-05-23 DIAGNOSIS — R00.0 TACHYCARDIA: ICD-10-CM

## 2022-05-23 DIAGNOSIS — Z00.00 ROUTINE ADULT HEALTH MAINTENANCE: Primary | ICD-10-CM

## 2022-05-23 DIAGNOSIS — J06.9 VIRAL UPPER RESPIRATORY TRACT INFECTION: ICD-10-CM

## 2022-05-23 DIAGNOSIS — F32.A DEPRESSION, UNSPECIFIED DEPRESSION TYPE: ICD-10-CM

## 2022-05-23 PROBLEM — E78.5 HYPERLIPIDEMIA: Status: ACTIVE | Noted: 2022-03-01

## 2022-05-23 PROCEDURE — 99214 OFFICE O/P EST MOD 30 MIN: CPT | Performed by: INTERNAL MEDICINE

## 2022-05-23 NOTE — PROGRESS NOTES
Chief Complaint   Patient presents with    Follow-up     Visit Vitals  BP (!) 144/99 (BP 1 Location: Left upper arm, BP Patient Position: Sitting, BP Cuff Size: Adult)   Pulse (!) 121   Temp 99.3 °F (37.4 °C) (Oral)   Ht 5' 5\" (1.651 m)   Wt 158 lb 11.2 oz (72 kg)   SpO2 98%   BMI 26.41 kg/m²         1. \"Have you been to the ER, urgent care clinic since your last visit? Hospitalized since your last visit? \" No    2. \"Have you seen or consulted any other health care providers outside of the [address] [place] [address] since your last visit? \" No     3. For patients aged 39-70: Has the patient had a colonoscopy / FIT/ Cologuard? No      If the patient is female:    4. For patients aged 41-77: Has the patient had a mammogram within the past 2 years? No      5. For patients aged 21-65: Has the patient had a pap smear?  No

## 2022-05-23 NOTE — PATIENT INSTRUCTIONS
Please stop using DayQuil, as this may be causing your high heart rate today. Please let me know if your high heart rate does not resolve after discontinuing this medication. Please also let me know if you develop any worsening chest pain, shortness of breath, or any other concerning symptoms. Please come back to the clinic for reevaluation should her symptoms of your cold not improve after 7 days of conservative therapy as described below. Upper Respiratory Infection (Cold): Care Instructions  Your Care Instructions     An upper respiratory infection, or URI, is an infection of the nose, sinuses, or throat. URIs are spread by coughs, sneezes, and direct contact. The common cold is the most frequent kind of URI. The flu and sinus infections are other kinds of URIs. Almost all URIs are caused by viruses. Antibiotics won't cure them. But you can treat most infections with home care. This may include drinking lots of fluids and taking over-the-counter pain medicine. You will probably feel better in 4 to 10 days. The doctor has checked you carefully, but problems can develop later. If you notice any problems or new symptoms, get medical treatment right away. Follow-up care is a key part of your treatment and safety. Be sure to make and go to all appointments, and call your doctor if you are having problems. It's also a good idea to know your test results and keep a list of the medicines you take. How can you care for yourself at home? · To prevent dehydration, drink plenty of fluids. Choose water and other clear liquids until you feel better. If you have kidney, heart, or liver disease and have to limit fluids, talk with your doctor before you increase the amount of fluids you drink. · Take an over-the-counter pain medicine, such as acetaminophen (Tylenol), ibuprofen (Advil, Motrin), or naproxen (Aleve). Read and follow all instructions on the label.   · Before you use cough and cold medicines, check the label. These medicines may not be safe for young children or for people with certain health problems. · Be careful when taking over-the-counter cold or flu medicines and Tylenol at the same time. Many of these medicines have acetaminophen, which is Tylenol. Read the labels to make sure that you are not taking more than the recommended dose. Too much acetaminophen (Tylenol) can be harmful. · Get plenty of rest.  · Do not smoke or allow others to smoke around you. If you need help quitting, talk to your doctor about stop-smoking programs and medicines. These can increase your chances of quitting for good. When should you call for help? Call 911 anytime you think you may need emergency care. For example, call if:    · You have severe trouble breathing. Call your doctor now or seek immediate medical care if:    · You seem to be getting much sicker.     · You have new or worse trouble breathing.     · You have a new or higher fever.     · You have a new rash. Watch closely for changes in your health, and be sure to contact your doctor if:    · You have a new symptom, such as a sore throat, an earache, or sinus pain.     · You cough more deeply or more often, especially if you notice more mucus or a change in the color of your mucus.     · You do not get better as expected. Where can you learn more? Go to http://www.gray.com/  Enter K520 in the search box to learn more about \"Upper Respiratory Infection (Cold): Care Instructions. \"  Current as of: July 6, 2021               Content Version: 13.2  © 2006-2022 [employer]. Care instructions adapted under license by [employer] (which disclaims liability or warranty for this information). If you have questions about a medical condition or this instruction, always ask your healthcare professional. [place] [address] any warranty or liability for your use of this information.              Saline Nasal Washes: Care Instructions  Overview     Saline nasal washes help keep the nasal passages open by washing out thick or dried mucus. This simple remedy can help relieve symptoms of allergies, sinusitis, and colds. It also can make the nose feel more comfortable by keeping the mucous membranes moist. You may notice a little burning sensation in your nose the first few times you use the solution, but this usually gets better in a few days. Follow-up care is a key part of your treatment and safety. Be sure to make and go to all appointments, and call your doctor if you are having problems. It's also a good idea to know your test results and keep a list of the medicines you take. How can you care for yourself at home? · You can buy premixed saline solution in a squeeze bottle or other sinus rinse products at a drugstore. Read and follow the instructions on the label. · You also can make your own saline solution by adding 1 teaspoon of non-iodized salt and 1 teaspoon of baking soda to 2 cups of distilled or boiled and cooled water. · If you use a homemade solution, use a squeeze bottle or neti pot to get the solution into your nose. Room temperature or slightly warmed water may be more comfortable. Make sure it isn't hot. · Stand over the sink with your head tilted forward and slightly to one side. Put only the tip of the syringe or squeeze bottle into the nostril that is farther away from the sink. (The nostril closest to the sink will drain the fluid.) Gently squirt the solution into the nostril and toward the back of your head with your mouth open. The solution should flow out the other nostril. Repeat on the other side. Some sneezing and gagging are normal at first.  · Gently blow your nose. · Clean the syringe or bottle after each use. · Repeat this 2 or 3 times a day. · Use nasal washes gently if you have nosebleeds often. When should you call for help?   Watch closely for changes in your health, and be sure to contact your doctor if:    · Your symptoms do not get better.     · You have problems doing the nasal washes. Where can you learn more? Go to http://www.gray.com/  Enter B784 in the search box to learn more about \"Saline Nasal Washes: Care Instructions. \"  Current as of: September 8, 2021               Content Version: 13.2  © 2006-2022 [employer]. Care instructions adapted under license by [employer] (which disclaims liability or warranty for this information). If you have questions about a medical condition or this instruction, always ask your healthcare professional. [place] [address] any warranty or liability for your use of this information. Fluticasone (Into the nose)   Fluticasone (mrxr-TJY-[id number]-sone)  Treats allergy symptoms, such as runny or stuffy nose. This medicine is a corticosteroid. Brand Name(s): Children's Flonase, ClariSpray, DermacinRx [employer], DermacinRx [place], [address] [place] [address], Flonase Sensimist, Fluticasone Propionate Novaplus, Ticaspray, Veramyst   There may be other brand names for this medicine. When This Medicine Should Not Be Used: This medicine is not right for everyone. Do not use if you had an allergic reaction to fluticasone. How to Use This Medicine:   Spray  · Your doctor will tell you how much medicine to use. Do not use more than directed. · This medicine is for use only in the nose. Do not get any of it in your eyes or on your skin. If it does get on these areas, rinse it off right away. · Prime the spray: Release 6 test sprays into the air away from the face, or pump the bottle until some of the medicine sprays out. Now it is ready to use. Prime the spray if it has not been used for more than 7 days (or 30 days for Veramyst®) or if the cap has been left off the bottle for 5 days or longer. · Shake the medicine well just before each use.   · Before using the medicine, gently blow your nose to clear the nostrils. · After using the nasal spray, wipe the tip of the bottle with a clean tissue and put the cap back on. · You may need to use this medicine for a few days before you start to feel better. · Read and follow the patient instructions that come with this medicine. Talk to your doctor or pharmacist if you have any questions. · Follow the instructions on the medicine label if you are using this medicine without a prescription. · Missed dose: Take a dose as soon as you remember. If it is almost time for your next dose, wait until then and take a regular dose. Do not take extra medicine to make up for a missed dose. · Keep the bottle tightly closed when not using it. Store at room temperature, away from heat and direct light. Do not freeze or refrigerate. Throw this medicine away after you use 120 sprays. Drugs and Foods to Avoid:   Ask your doctor or pharmacist before using any other medicine, including over-the-counter medicines, vitamins, and herbal products. · Do not use this medicine together with ritonavir. · Some foods and medicines can affect how fluticasone works. Tell your doctor if you are using ketoconazole. Warnings While Using This Medicine:   · Tell your doctor if you are pregnant or breastfeeding, or if you have liver disease, asthma, an infection, or a history of cataracts or glaucoma. Make sure your doctor knows if you have had nose surgery, a nose injury, or a recent infection in your nose. · This medicine may cause the following problems:  ¨ Holes or ulcers inside the nose  ¨ Slow wound healing  ¨ Cataracts or glaucoma  ¨ Problems with the adrenal glands  ¨ Slow growth in children  · Avoid people who are sick or have infections. Tell your doctor right away if you think you have been exposed to measles or chickenpox. · Your doctor will check your progress and the effects of this medicine at regular visits. Keep all appointments.   · Call your doctor if your symptoms do not improve or if they get worse. · Keep all medicine out of the reach of children. Never share your medicine with anyone. Possible Side Effects While Using This Medicine:   Call your doctor right away if you notice any of these side effects:  · Allergic reaction: Itching or hives, swelling in your face or hands, swelling or tingling in your mouth or throat, chest tightness, trouble breathing  · Burning, redness, swelling, or irritation around or inside your nose  · Eye pain or vision changes  · Fever, chills, cough, sore throat, and body aches  · Heavy nosebleeds  · Sores or white patches inside the nose or mouth  · Tiredness, weakness, dizziness  If you notice other side effects that you think are caused by this medicine, tell your doctor. Call your doctor for medical advice about side effects. You may report side effects to FDA at 7-750-FDA-4409  © 2017 [facility] Information is for End User's use only and may not be sold, redistributed or otherwise used for commercial purposes. The above information is an  only. It is not intended as medical advice for individual conditions or treatments. Talk to your doctor, nurse or pharmacist before following any medical regimen to see if it is safe and effective for you.

## 2022-09-14 ENCOUNTER — PATIENT MESSAGE (OUTPATIENT)
Dept: INTERNAL MEDICINE CLINIC | Age: 22
End: 2022-09-14

## 2022-09-15 RX ORDER — DULOXETIN HYDROCHLORIDE 20 MG/1
20 CAPSULE, DELAYED RELEASE ORAL 2 TIMES DAILY
Qty: 180 CAPSULE | Refills: 3 | Status: SHIPPED | OUTPATIENT
Start: 2022-09-15 | End: 2022-09-29 | Stop reason: ALTCHOICE

## 2022-09-15 NOTE — TELEPHONE ENCOUNTER
From: Marcus Bryant  To: Matt Salvador MD  Sent: 9/14/2022 2:38 PM EDT  Subject: Tiredness due to Cymbalta    Good Afternoon,    After taking Cymbalta for several months, persistent tiredness has been a reoccurring symptom and interferes with my concentration and ability to stay awake during the day. Lack of sleep is not an issue as I get ample sleep uninterrupted. Is there a solution we could discuss?     Thank you,  Nidia Aguilar

## 2022-09-28 NOTE — TELEPHONE ENCOUNTER
Hi Ms. Vinny Garsia,     An SSRI would probably be reasonable, though we'd want you off the duloxetine first. We can also refer you for a sleep study, though your pre-test probability for obstructive sleep apnea (the most common malady in most adults) is not really high. If you like, I can place a referral for you. For the SSRI, we could replace the duloxetine with an SSRI such as sertraline or escitalopram. Please let me know if you'd like me to do these two things.     Dr. Ramana Lennon

## 2022-09-29 DIAGNOSIS — F32.A DEPRESSION, UNSPECIFIED DEPRESSION TYPE: Primary | ICD-10-CM

## 2022-09-29 RX ORDER — ESCITALOPRAM OXALATE 10 MG/1
10 TABLET ORAL DAILY
Qty: 90 TABLET | Refills: 3 | Status: SHIPPED | OUTPATIENT
Start: 2022-09-29

## 2022-10-28 DIAGNOSIS — R53.83 FATIGUE, UNSPECIFIED TYPE: Primary | ICD-10-CM

## 2022-11-02 ENCOUNTER — PATIENT MESSAGE (OUTPATIENT)
Dept: SLEEP MEDICINE | Age: 22
End: 2022-11-02

## 2022-11-08 ENCOUNTER — PATIENT MESSAGE (OUTPATIENT)
Dept: SLEEP MEDICINE | Age: 22
End: 2022-11-08

## 2022-11-15 NOTE — PROGRESS NOTES
ICD-10-CM ICD-9-CM    1. Routine adult health maintenance  Z00.00 V70.0       2. Other depression  F32.89 311                Subjective:     Chief Complaint   Patient presents with    Follow-up       Mindy Yang is a 20 y.o. F.  She has a past medical history of depression as well as fibromyalgia. I reviewed and updated the medical record. I saw this patient most recently in late May for routine follow-up. She had recently discontinued alcohol use, and had felt that duloxetine 60 mg daily was well effective for her. Her mood was notably improving. Physical examination at the time was notable for hypertension with a blood pressure of 144/99 mmHg as well as tachycardia. We had discussed eliminating sympathomimetic nasal decongestants which the patient was using at the time for rhinorrhea. More recently, the patient had complained of Cymbalta potentially causing difficulty sleeping, and I had decreased her dose to 20 mg twice daily. As she was complaining about difficulty sleeping, I had referred her for sleep medicine evaluation. Today, the patient comes in for routine follow-up. She reports feeling fine overall today and has no significant acute medical plaints. Since we started the patient on escitalopram as opposed to Cymbalta, she has been doing generally better. Her sleep is about the same, however. Her mood overall continues to be well controlled, and significantly improved compared to prior. She denies any suicidal or homicidal ideation. No significant anxiety, will medicate with sews. Depression largely improved. She has pending follow-up with sleep medicine in the coming months to further evaluate her history of fatigue; she notes that her father had been diagnosed with narcolepsy at around this age. Other than the escitalopram, she does not take other medications. Review of systems is otherwise negative.   She reports having had a Pap smear done last year, and reports that this was unremarkable. She reports being up-to-date with regard to her flu and COVID vaccines. Routine Healthcare Maintenance issues are reviewed and discussed with the patient as noted below. Orders to update gaps in healthcare maintenance were placed as noted below in the Assessment and Plan, where applicable. Past Medical History:  Past Medical History:   Diagnosis Date    Depression     02/2022: therapy    Fibromyalgia 02/2022    Former smoker, stopped smoking in distant past     History of fatigue 02/2022    History of ovarian cyst     Hyperlipidemia 03/2022    diet controlled, .2 mg/dL    Vitamin D deficiency        Past Surgical Histor:  Past Surgical History:   Procedure Laterality Date    HX WISDOM TEETH EXTRACTION         Allergies:  No Known Allergies    Medications:  Current Outpatient Medications   Medication Sig Dispense Refill    escitalopram oxalate (LEXAPRO) 10 mg tablet Take 1 Tablet by mouth daily.  90 Tablet 3       Social History:  Social History     Socioeconomic History    Marital status: LIFE PARTNER   Tobacco Use    Smoking status: Former    Smokeless tobacco: Former   Substance and Sexual Activity    Alcohol use: Yes     Comment: socially    Drug use: Not Currently    Sexual activity: Yes       Family History:  Family History   Problem Relation Age of Onset    Hypertension Mother     Sleep Apnea Father     Kidney cancer Maternal Grandmother     Diabetes Maternal Grandfather        Immunizations:  Immunization History   Administered Date(s) Administered    COVID-19, MODERNA BLUE border, Primary or Immunocompromised, (age 18y+), IM, 100 mcg/0.5mL 04/06/2021, 05/06/2021    COVID-19, MODERNA Booster BLUE border, (age 18y+), IM, 50mcg/0.25mL 11/16/2021    Influenza Vaccine 10/15/2021, 10/13/2022    Tdap 02/23/2020        Healthcare Maintenance:  Health Maintenance   Topic Date Due    HPV Age 9Y-34Y (1 - 2-dose series) Never done    COVID-19 Vaccine (4 - Booster for Johnice Minks series) 01/11/2022    Depression Monitoring  05/23/2023    Pap Smear  05/01/2024    DTaP/Tdap/Td series (2 - Td or Tdap) 02/23/2030    Hepatitis C Screening  Completed    Flu Vaccine  Completed    Pneumococcal 0-64 years  Aged Out        Review of Systems:  ROS:  Review of Systems   Constitutional: Negative. HENT: Negative. Eyes: Negative. Respiratory: Negative. Cardiovascular: Negative. Gastrointestinal: Negative. Genitourinary: Negative. Musculoskeletal: Negative. Skin: Negative. Neurological: Negative. Endo/Heme/Allergies: Negative. Psychiatric/Behavioral: Negative. ROS otherwise negative      Objective:     Vital Signs:  Visit Vitals  /80 (BP 1 Location: Left upper arm, BP Patient Position: Sitting, BP Cuff Size: Adult)   Pulse 90   Resp 18   Ht 5' 5\" (1.651 m)   Wt 174 lb 4.8 oz (79.1 kg)   SpO2 97%   BMI 29.01 kg/m²       BMI:  Body mass index is 29.01 kg/m². Physical Examination:  Physical Exam  Constitutional:       Appearance: Normal appearance. She is normal weight. HENT:      Head: Normocephalic and atraumatic. Right Ear: External ear normal.      Left Ear: External ear normal.      Nose: Nose normal.      Mouth/Throat:      Mouth: Mucous membranes are moist.      Pharynx: Oropharynx is clear. No oropharyngeal exudate or posterior oropharyngeal erythema. Cardiovascular:      Rate and Rhythm: Normal rate and regular rhythm. Pulses: Normal pulses. Heart sounds: Normal heart sounds. No murmur heard. No friction rub. No gallop. Pulmonary:      Effort: Pulmonary effort is normal. No respiratory distress. Breath sounds: Normal breath sounds. No wheezing, rhonchi or rales. Abdominal:      General: Abdomen is flat. Bowel sounds are normal. There is no distension. Palpations: Abdomen is soft. Tenderness: There is no abdominal tenderness. There is no guarding. Musculoskeletal:         General: No swelling, tenderness or deformity. Normal range of motion. Cervical back: Normal range of motion and neck supple. No rigidity or tenderness. Skin:     General: Skin is warm and dry. Findings: No erythema, lesion or rash. Neurological:      General: No focal deficit present. Mental Status: She is alert and oriented to person, place, and time. Mental status is at baseline. Sensory: No sensory deficit. Motor: No weakness. Gait: Gait normal.      Deep Tendon Reflexes: Reflexes normal.   Psychiatric:         Mood and Affect: Mood normal.         Behavior: Behavior normal.         Judgment: Judgment normal.        Physical exam otherwise negative    Diagnostic Testing:    Laboratory Studies:  No visits with results within 6 Month(s) from this visit. Latest known visit with results is:   Appointment on 03/02/2022   Component Date Value Ref Range Status    HCG urine, QL 03/02/2022 Negative  Negative   Final    Sed rate, automated 03/02/2022 7  0 - 20 mm/hr Final    CK 03/02/2022 134  26 - 192 U/L Final    Vitamin B12 03/02/2022 528  193 - 986 pg/mL Final    Vitamin D 25-Hydroxy 03/02/2022 24.1 (A)  30 - 100 ng/mL Final    Comment: (NOTE)  Deficiency               <20 ng/mL  Insufficiency          20-30 ng/mL  Sufficient             ng/mL  Possible toxicity       >100 ng/mL    The Method used is Siemens Advia Centaur currently standardized to a   Center of Disease Control and Prevention (CDC) certified reference   [address]. Samples containing fluorescein dye can produce falsely   elevated values when tested with the ADVIA Centaur Vitamin D Assay. It is recommended that results in the toxic range, >100 ng/mL, be   retested 72 hours post fluorescein exposure. TSH 03/02/2022 1.37  0.36 - 3.74 uIU/mL Final    Comment:   Due to TSH heterogeneity, both structurally and degree of glycosylation,  monoclonal antibodies used in the TSH assay may not accurately quantitate TSH.   Therefore, this result should be correlated with clinical findings as well as  with other assessments of thyroid function, e.g., free T4, free T3. HIV 1/2 Interpretation 03/02/2022 NONREACTIVE  NONREACTIVE   Final    HIV 1/2 result comment 03/02/2022 SEE NOTE    Final    Comment: While this assay is highly sensitive, a non-reactive/negative result for HIV  antibodies HIV-1 and HIV-2 and p24 antigen, does not exclude the possibility of  exposure to or infection with HIV. HIV antibodies and/or p24 antigen may be  undetectable in some stages of the infection and in some clinical conditions. Test performed by the ADVRFMarqaur HIV Ag/Ab Combo (CHIV), 4th generation  assay. Recommend consulting relevant CDC guidelines for informing test subject  of the result and its interpretation. Cholesterol, total 03/02/2022 201 (A)  <200 MG/DL Final    Triglyceride 03/02/2022 44  <150 MG/DL Final    Comment: Based on NCEP-ATP III:  Triglycerides <150 mg/dL  is considered normal, 150-199  mg/dL  borderline high,  200-499 mg/dL high and  greater than or equal to 500  mg/dL very high. HDL Cholesterol 03/02/2022 91  MG/DL Final    Comment: Based on NCEP ATP III, HDL Cholesterol <40 mg/dL is considered low and >60  mg/dL is elevated.       LDL, calculated 03/02/2022 101.2 (A)  0 - 100 MG/DL Final    Comment: Based on the NCEP-ATP: LDL-C concentrations are considered  optimal <100 mg/dL,  near optimal/above Normal 100-129 mg/dL Borderline High: 130-159, High: 160-189  mg/dL Very High: Greater than or equal to 190 mg/dL      VLDL, calculated 03/02/2022 8.8  MG/DL Final    CHOL/HDL Ratio 03/02/2022 2.2  0.0 - 5.0   Final    Sodium 03/02/2022 137  136 - 145 mmol/L Final    Potassium 03/02/2022 4.2  3.5 - 5.1 mmol/L Final    Chloride 03/02/2022 104  97 - 108 mmol/L Final    CO2 03/02/2022 29  21 - 32 mmol/L Final    Anion gap 03/02/2022 4 (A)  5 - 15 mmol/L Final    Glucose 03/02/2022 79  65 - 100 mg/dL Final    BUN 03/02/2022 10  6 - 20 MG/DL Final    Creatinine 03/02/2022 0.67  0.55 - 1.02 MG/DL Final    BUN/Creatinine ratio 03/02/2022 15  12 - 20   Final    GFR est AA 03/02/2022 >60  >60 ml/min/1.73m2 Final    GFR est non-AA 03/02/2022 >60  >60 ml/min/1.73m2 Final    Comment: Estimated GFR is calculated using the IDMS-traceable Modification of Diet in  Renal Disease (MDRD) Study equation, reported for both  Americans  (GFRAA) and non- Americans (GFRNA), and normalized to 1.73m2 body  surface area. The physician must decide which value applies to the patient. Calcium 03/02/2022 9.2  8.5 - 10.1 MG/DL Final    Bilirubin, total 03/02/2022 0.6  0.2 - 1.0 MG/DL Final    ALT (SGPT) 03/02/2022 23  12 - 78 U/L Final    AST (SGOT) 03/02/2022 24  15 - 37 U/L Final    Alk. phosphatase 03/02/2022 83  45 - 117 U/L Final    Protein, total 03/02/2022 7.6  6.4 - 8.2 g/dL Final    Albumin 03/02/2022 4.3  3.5 - 5.0 g/dL Final    Globulin 03/02/2022 3.3  2.0 - 4.0 g/dL Final    A-G Ratio 03/02/2022 1.3  1.1 - 2.2   Final    WBC 03/02/2022 5.0  3.6 - 11.0 K/uL Final    RBC 03/02/2022 4.53  3.80 - 5.20 M/uL Final    HGB 03/02/2022 13.7  11.5 - 16.0 g/dL Final    HCT 03/02/2022 42.2  35.0 - 47.0 % Final    MCV 03/02/2022 93.2  80.0 - 99.0 FL Final    MCH 03/02/2022 30.2  26.0 - 34.0 PG Final    MCHC 03/02/2022 32.5  30.0 - 36.5 g/dL Final    RDW 03/02/2022 13.2  11.5 - 14.5 % Final    PLATELET 70/16/7742 136  150 - 400 K/uL Final    MPV 03/02/2022 10.2  8.9 - 12.9 FL Final    NRBC 03/02/2022 0.0  0  WBC Final    ABSOLUTE NRBC 03/02/2022 0.00  0.00 - 0.01 K/uL Final    NEUTROPHILS 03/02/2022 53  32 - 75 % Final    LYMPHOCYTES 03/02/2022 33  12 - 49 % Final    MONOCYTES 03/02/2022 11  5 - 13 % Final    EOSINOPHILS 03/02/2022 2  0 - 7 % Final    BASOPHILS 03/02/2022 1  0 - 1 % Final    IMMATURE GRANULOCYTES 03/02/2022 0  0.0 - 0.5 % Final    ABS. NEUTROPHILS 03/02/2022 2.6  1.8 - 8.0 K/UL Final    ABS. LYMPHOCYTES 03/02/2022 1.6  0.8 - 3.5 K/UL Final    ABS.  MONOCYTES 03/02/2022 0.6  0.0 - 1.0 K/UL Final    ABS. EOSINOPHILS 03/02/2022 0.1  0.0 - 0.4 K/UL Final    ABS. BASOPHILS 03/02/2022 0.0  0.0 - 0.1 K/UL Final    ABS. IMM. GRANS. 03/02/2022 0.0  0.00 - 0.04 K/UL Final    DF 03/02/2022 AUTOMATED    Final    Hemoglobin A1c 03/02/2022 4.6  4.0 - 5.6 % Final    Comment: NEW METHOD PLEASE NOTE NEW REFERENCE RANGE  (NOTE)  HbA1C Interpretive Ranges  <5.7              Normal  5.7 - 6.4         Consider Prediabetes  >6.5              Consider Diabetes      Est. average glucose 03/02/2022 85  mg/dL Final    Hep C virus Ab Interp. 03/02/2022 NONREACTIVE  NONREACTIVE   Final    Method used is Siemens Advia Centaur         Radiographic Studies:  XR Results (most recent):  No results found for this or any previous visit. CARLOS Results (most recent):  No results found for this or any previous visit. CT Results (most recent):  No results found for this or any previous visit. DEXA Results (most recent):  No results found for this or any previous visit. MRI Results (most recent):  No results found for this or any previous visit. Assessment/Plan:       ICD-10-CM ICD-9-CM    1. Routine adult health maintenance  Z00.00 V70.0       2. Other depression  F32.89 311              Healthcare Maintenance:  - Preventive measures are reviewed as per above  - Up to date on routine interventions except as noted above  - Orders placed to update gaps as noted  - Notes: Pap smear UTD, labs reviewed with patient as per above, CCM    Anxiety/Depression:   - Current PHQ: No data recorded    - Current RX:   Key Psychotherapeutic Meds               escitalopram oxalate (LEXAPRO) 10 mg tablet (Taking) Take 1 Tablet by mouth daily. Key Neurological Meds       The patient is on no neurologic meds. - Psychology/Psychiatry Co-managing? No   - awaiting sleep study results. Tolerating Lexapro. No red flag symptoms. CCM.                I have reviewed the patient's medical history in detail and updated the computerized patient record. We had a prolonged discussion about these complex clinical issues and went over the various important aspects to consider. All questions were answered. Advised the patient to call back or return to office if symptoms do not improve, change in nature, or persist.     The patient was given an after visit summary or informed of [facility] which includes patient instructions, diagnoses, current medications, & vitals. she expressed understanding with the diagnosis and plan. Katheen Halsted, MD    Please note that this dictation was completed with [employer], the computer voice recognition software. Quite often unanticipated grammatical, syntax, homophones, and other interpretive errors are inadvertently transcribed by the computer software. Please disregard these errors. Please excuse any errors that have escaped final proofreading.

## 2022-11-23 ENCOUNTER — OFFICE VISIT (OUTPATIENT)
Dept: INTERNAL MEDICINE CLINIC | Age: 22
End: 2022-11-23
Payer: COMMERCIAL

## 2022-11-23 VITALS
DIASTOLIC BLOOD PRESSURE: 80 MMHG | HEART RATE: 90 BPM | SYSTOLIC BLOOD PRESSURE: 120 MMHG | WEIGHT: 174.3 LBS | HEIGHT: 65 IN | RESPIRATION RATE: 18 BRPM | OXYGEN SATURATION: 97 % | BODY MASS INDEX: 29.04 KG/M2

## 2022-11-23 DIAGNOSIS — F32.89 OTHER DEPRESSION: ICD-10-CM

## 2022-11-23 DIAGNOSIS — Z00.00 ROUTINE ADULT HEALTH MAINTENANCE: Primary | ICD-10-CM

## 2022-11-23 PROCEDURE — 99214 OFFICE O/P EST MOD 30 MIN: CPT | Performed by: INTERNAL MEDICINE

## 2022-11-23 NOTE — PROGRESS NOTES
Chief Complaint   Patient presents with    Follow-up       1. \"Have you been to the ER, urgent care clinic since your last visit? Hospitalized since your last visit? \" No    2. \"Have you seen or consulted any other health care providers outside of the [address] [place] [address] since your last visit? \" No     3. For patients aged 39-70: Has the patient had a colonoscopy / FIT/ Cologuard? No      If the patient is female:    4. For patients aged 41-77: Has the patient had a mammogram within the past 2 years? No      5. For patients aged 21-65: Has the patient had a pap smear?  No

## 2022-12-22 ENCOUNTER — TELEPHONE (OUTPATIENT)
Dept: SLEEP MEDICINE | Age: 22
End: 2022-12-22

## 2022-12-22 ENCOUNTER — VIRTUAL VISIT (OUTPATIENT)
Dept: SLEEP MEDICINE | Age: 22
End: 2022-12-22
Payer: COMMERCIAL

## 2022-12-22 DIAGNOSIS — G47.10 HYPERSOMNIA: Primary | ICD-10-CM

## 2022-12-22 PROCEDURE — 99203 OFFICE O/P NEW LOW 30 MIN: CPT | Performed by: INTERNAL MEDICINE

## 2022-12-22 NOTE — Clinical Note
Thank you for the referral.  I will keep you informed of her progress.  [address], [place]

## 2022-12-22 NOTE — PATIENT INSTRUCTIONS
[address] [facility] [place]. [place], [address]  Tel.  [phone]  Fax. [address] [facility] 60  [place], [address]  Tel.  [phone]  Fax. [phone] [address]  Tel.  [phone]  Fax. [phone]     PROPER SLEEP HYGIENE    What to avoid  Do not have drinks with caffeine, such as coffee or black tea, for 8 hours before bed. Do not smoke or use other types of tobacco near bedtime. Nicotine is a stimulant and can keep you awake. Avoid drinking alcohol late in the evening, because it can cause you to wake in the middle of the night. Do not eat a big meal close to bedtime. If you are hungry, eat a light snack. Do not drink a lot of water close to bedtime, because the need to urinate may wake you up during the night. Do not read or watch TV in bed. Use the bed only for sleeping and sexual activity. What to try  Go to bed at the same time every night, and wake up at the same time every morning. Do not take naps during the day. Keep your bedroom quiet, dark, and cool. Get regular exercise, but not within 3 to 4 hours of your bedtime. .  Sleep on a comfortable pillow and mattress. If watching the clock makes you anxious, turn it facing away from you so you cannot see the time. If you worry when you lie down, start a worry book. Well before bedtime, write down your worries, and then set the book and your concerns aside. Try meditation or other relaxation techniques before you go to bed. If you cannot fall asleep, get up and go to another room until you feel sleepy. Do something relaxing. Repeat your bedtime routine before you go to bed again. Make your house quiet and calm about an hour before bedtime. Turn down the lights, turn off the TV, log off the computer, and turn down the volume on music. This can help you relax after a busy day.     Drowsy Driving  The [address] [place] [address] Road Traffic Safety Administration cites drowsiness as a causing factor in more than 855,748 police reported crashes annually, resulting in 76,000 injuries and 1,500 deaths. Other surveys suggest 55% of people polled have driven while drowsy in the past year, 23% had fallen asleep but not crashed, 3% crashed, and 2% had and accident due to drowsy driving. Who is at risk? Young Drivers: One study of drowsy driving accidents states that 55% of the drivers were under 25 years. Of those, 75% were male. Shift Workers and Travelers: People who work overnight or travel across time zones frequently are at higher risk of experiencing Circadian Rhythm Disorders. They are trying to work and function when their body is programed to sleep. Sleep Deprived: Lack of sleep has a serious impact on your ability to pay attention or focus on a task. Consistently getting less than the average of 8 hours your body needs creates partial or cumulative sleep deprivation. Untreated Sleep Disorders: Sleep Apnea, Narcolepsy, R.L.S., and other sleep disorders (untreated) prevent a person from getting enough restful sleep. This leads to excessive daytime sleepiness and increases the risk for drowsy driving accidents by up to 7 times. Medications / Alcohol: Even over the counter medications can cause drowsiness. Medications that impair a drivers attention should have a warning label. Alcohol naturally makes you sleepy and on its own can cause accidents. Combined with excessive drowsiness its effects are amplified. Signs of Drowsy Driving:   * You don't remember driving the last few miles   * You may drift out of your meek   * You are unable to focus and your thoughts wander   * You may yawn more often than normal   * You have difficulty keeping your eyes open / nodding off   * Missing traffic signs, speeding, or tailgating  Prevention-   Good sleep hygiene, lifestyle and behavioral choices have the most impact on drowsy driving.  There is no substitute for sleep and the average person requires 8 hours nightly. If you find yourself driving drowsy, stop and sleep. Consider the sleep hygiene tips provided during your visit as well. Medication Refill Policy: Refills for all medications require 1 week advance notice. Please have your pharmacy fax a refill request. We are unable to fax, or call in \"controled substance\" medications and you will need to pick these prescriptions up from our office. [employer] Activation    Thank you for requesting access to [employer]. Please follow the instructions below to securely access and download your online medical record. Bottle allows you to send messages to your doctor, view your test results, renew your prescriptions, schedule appointments, and more. How Do I Sign Up? In your internet browser, go to [email or web address]. Click on the First Time User? Click Here link in the Sign In box. You will see the New Member Sign Up page. Enter your Bottle Access Code exactly as it appears below. You will not need to use this code after youve completed the sign-up process. If you do not sign up before the expiration date, you must request a new code. Bottle Access Code: Activation code not generated  Current Bottle Status: Active (This is the date your Bottle access code will )    Enter the last four digits of your Social Security Number (xxxx) and Date of Birth (mm/dd/yyyy) as indicated and click Submit. You will be taken to the next sign-up page. Create a Bottle ID. This will be your Bottle login ID and cannot be changed, so think of one that is secure and easy to remember. Create a Bottle password. You can change your password at any time. Enter your Password Reset Question and Answer. This can be used at a later time if you forget your password. Enter your e-mail address. You will receive e-mail notification when new information is available in [address] Click Sign Up.  You can now view and download portions of your medical record. Click the [employer] link to download a portable copy of your medical information. Additional Information    If you have questions, please call [phone]. Remember, [employer] is NOT to be used for urgent needs. For medical emergencies, dial 911.

## 2022-12-22 NOTE — PROGRESS NOTES
[address] [facility] [place]. [place], [address]  Tel.  [phone]  Fax. 100 [facility] 60  [place], [address] [facility]  Tel.  [phone]  Fax. [phone] [address]  Tel.  [phone]  Fax. [phone]         Subjective:        Telemedicine visit performed with verbal consent of the patient. ID confirmed with 2 patient identifiers  Patient was seen at home  Latha Farmer is a 20 y.o. female who was seen by synchronous (real-time) audio-video technology on 12/22/2022. Consent:  She and/or her healthcare decision maker is aware that this patient-initiated Telehealth encounter is the equivalent to a face to face encounter in the sleep disorder center and has provided verbal consent to proceed: Yes    I was at home while conducting this encounter. Latha Farmer is an 20 y.o. female referred for evaluation for a sleep disorder. She complains of feeling sleepy and tired associated with concern that she has a sleep disorder (her falther has hypersomnia and takes medicine for it). Symptoms began 6 months ago, gradually worsening since that time. She usually can fall asleep in a few minutes. Family or house members note no significant snoring. She denies falling asleep while at work, driving. Annette Dave (P) does not wake up frequently at night. She (P) is not bothered by waking up too early and left unable to get back to sleep. She actually sleeps about (P) 6 hours at night and wakes up about (P) 0 times during the night. She (P) does not work shifts: Leann Tobias indicates she (P) does not get too little sleep at night. Her bedtime is (P) 0100. She awakens at (P) 0800. She (P) does take naps. She takes (P) 5 naps a week lasting (P) 15 to 30, 30 to 45, 45 min to an hour. She has the following observed behaviors: (P) Grinding teeth, Sleep talking;  .   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 Independence Sleepiness Score: (P) 13 which reflect mild daytime drowsiness. No Known Allergies      Current Outpatient Medications:     escitalopram oxalate (LEXAPRO) 10 mg tablet, Take 1 Tablet by mouth daily. , Disp: 90 Tablet, Rfl: 3     She  has a past medical history of Depression, Fibromyalgia (02/2022), Former smoker, stopped smoking in distant past, History of fatigue (02/2022), History of ovarian cyst, Hyperlipidemia (03/2022), and Vitamin D deficiency. She  has a past surgical history that includes hx wisdom teeth extraction. She family history includes Diabetes in her maternal grandfather; Hypertension in her mother; Kidney cancer in her maternal grandmother; Sleep Apnea in her father. She  reports that she has quit smoking. She has quit using smokeless tobacco. She reports that she does not currently use alcohol. She reports that she does not currently use drugs. 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 She hasn't had a drink in 8 months    Review of Systems:  Constitutional:  5-10 pound  weight gain  Eyes:  No blurred vision  CVS:  No significant chest pain  Pulm:  No significant shortness of breath  GI:  No significant nausea or vomiting  :  No significant nocturia  Musculoskeletal:  No significant joint pain at night, muscles just feel tired (like she has exercised a lot)  Skin:  No significant rashes  Neuro:  No significant dizziness   Psych:  depression controlled. Sleep Review of Systems: notable for no difficulty falling asleep; infrequent awakenings at night;  + dreaming noted; no nightmares ; some early morning headaches; minor memory problems; some concentration issues(which she feels is due to her being tired); Objective: There were no vitals taken for this visit.       Vital Signs: (As obtained by patient/caregiver at home)        Constitutional: [x] Appears well-developed and well-nourished [x] No apparent distress      [] Abnormal -     Mental status: [x] Alert and awake  [x] Oriented to person/place/time [x] Able to follow commands    [] Abnormal -     Eyes:   EOM    [x]  Normal    [] Abnormal -   Sclera  [x]  Normal    [] Abnormal -          Discharge [x]  None visible   [] Abnormal -     HENT: [x] Normocephalic, atraumatic  [] Abnormal -   [x] Mouth/Throat: Mucous membranes are moist                   External Ears [x] Normal  [] Abnormal -    Neck: [x] No visualized mass [] Abnormal -     Pulmonary/Chest: [x] Respiratory effort normal   [x] No visualized signs of difficulty breathing or respiratory distress        [] Abnormal -       Neurological:        [x] No Facial Asymmetry (Cranial nerve 7 motor function) (limited exam due to video visit)          [x] No gaze palsy        [] Abnormal -          Skin:        [x] No significant exanthematous lesions or discoloration noted on facial skin         [] Abnormal -            Psychiatric:       [x] Normal Affect [] Abnormal -       Other pertinent observable physical exam findings:-          Assessment:       ICD-10-CM ICD-9-CM    1. Hypersomnia  G47.10 780.54 POLYSOMNOGRAPHY 1 NIGHT      POLYSOMNOGRAPHY (MSLT)      DRUG ABUSE PROF, URINE (SEVEN DRUGS), MS COFIRM            Plan:       * PSG/MSLT was ordered for initial evaluation. * She was provided information on sleep apnea including coresponding risk factors and the importance of proper treatment. * Counseling was provided regarding proper sleep hygiene, appropriate sleep schedule, need for sleep environment safety and safe driving. She was advised to ensure 8 hours of sleep nightly. Proper sleep environment reviewed (cool,dark, free of electronics). She will be seen 2 weeks after testing to review results   All of her questions were addressed.        Thank you for allowing us to participate in your patient's medical care. We'll keep you updated on these investigations. Pursuant to the emergency declaration under the [facility], [facility] waiver authority and the [employer] Act, this Virtual  Visit was conducted, with patient's consent, to reduce the patient's risk of exposure to COVID-19 and provide continuity of care for an established patient. Services were provided through a video synchronous discussion virtually to substitute for in-person clinic visit.     Marc Anthony MD    Electronically signed by    Leslie Xavier MD  Diplomate in Sleep Medicine  [facility]

## 2023-01-05 ENCOUNTER — TELEPHONE (OUTPATIENT)
Dept: SLEEP MEDICINE | Age: 23
End: 2023-01-05

## 2023-02-20 ENCOUNTER — DOCUMENTATION ONLY (OUTPATIENT)
Dept: SLEEP MEDICINE | Age: 23
End: 2023-02-20

## 2023-02-20 NOTE — PROGRESS NOTES
Scheduled on 2/23/2023 and 2/24/2023 respectively for PSG/MSLT at [facility]. Due to scheduling error, studies will need to be rescheduled. Voicemail left for patient to call office. Offer either 2/20/2023 and 2/21/2023 or 2/21/2023 and 2/22/2023 options.

## 2023-02-21 ENCOUNTER — HOSPITAL ENCOUNTER (OUTPATIENT)
Dept: SLEEP MEDICINE | Age: 23
Discharge: HOME | End: 2023-02-21
Attending: INTERNAL MEDICINE
Payer: COMMERCIAL

## 2023-02-21 DIAGNOSIS — G47.10 HYPERSOMNIA: ICD-10-CM

## 2023-02-21 PROCEDURE — 95810 POLYSOM 6/> YRS 4/> PARAM: CPT | Performed by: INTERNAL MEDICINE

## 2023-02-22 ENCOUNTER — TELEPHONE (OUTPATIENT)
Dept: SLEEP MEDICINE | Age: 23
End: 2023-02-22

## 2023-02-22 ENCOUNTER — HOSPITAL ENCOUNTER (OUTPATIENT)
Dept: SLEEP MEDICINE | Age: 23
Discharge: HOME | End: 2023-02-22
Attending: INTERNAL MEDICINE
Payer: COMMERCIAL

## 2023-02-22 VITALS
OXYGEN SATURATION: 96 % | TEMPERATURE: 97.1 F | SYSTOLIC BLOOD PRESSURE: 135 MMHG | HEIGHT: 65 IN | BODY MASS INDEX: 28.99 KG/M2 | HEART RATE: 103 BPM | DIASTOLIC BLOOD PRESSURE: 83 MMHG | WEIGHT: 174 LBS

## 2023-02-22 DIAGNOSIS — G47.10 HYPERSOMNIA: ICD-10-CM

## 2023-02-22 PROCEDURE — 95805 MULTIPLE SLEEP LATENCY TEST: CPT | Performed by: INTERNAL MEDICINE

## 2023-02-22 NOTE — PROGRESS NOTES
217 [facility] [place]. [place], [address]  Tel.  [phone]  Fax. 100 [facility] 60  [place], [address] [facility]  Tel.  [phone]  Fax. [phone] [address]   Tel.  [phone]  Fax. [phone]     Sleep Study Technical Notes        PRE-Test:  Efra Sanchez (: 2000) arrived on time Vitals taken: temperature (97.1) BP (135/83) SpO2 (96%) HR (103). She was shown directly to the room. Paperwork completed. She was left to relax and prepare for bed. She is here for a PSG with a MSLT to follow. Procedure explained, questions answered. The patient understands that she needs to meet the sleep requirements to proceed with the MSLT. Electrodes were applied without incident. Once settled in bed, the study was started. Acquisition Notes:  Lights off: 10:45pm  Sleep onset: 11:11pm  Respiratory events: None  ECG:  NSR  Other comments:   No respiratory events were observed. TST=6.40 hrs  Sleep Efficiency=85.4%  REM Percentage=13.7%  REM Gqkxrci=766 minutes  AHI=0.0  Lowest O2=96%    POST Test:  Patient awakened. Electrodes not needed for the MSLT were removed. Post Sleep Questionnaire completed. Patient was turned over to the daytime technologist. Equipment removed. cleaned per infection control policy.

## 2023-02-27 ENCOUNTER — DOCUMENTATION ONLY (OUTPATIENT)
Dept: SLEEP MEDICINE | Age: 23
End: 2023-02-27

## 2023-02-28 NOTE — TELEPHONE ENCOUNTER
Results appt. OK to schedule 440pm if nothing else available per Dr. Maryjane Landa. LM to schedule.

## 2023-03-03 ENCOUNTER — PATIENT MESSAGE (OUTPATIENT)
Dept: SLEEP MEDICINE | Age: 23
End: 2023-03-03

## 2023-03-30 ENCOUNTER — VIRTUAL VISIT (OUTPATIENT)
Dept: SLEEP MEDICINE | Age: 23
End: 2023-03-30
Payer: COMMERCIAL

## 2023-03-30 DIAGNOSIS — G47.10 HYPERSOMNIA: Primary | ICD-10-CM

## 2023-03-30 DIAGNOSIS — Z86.59 H/O: DEPRESSION: ICD-10-CM

## 2023-03-30 PROCEDURE — 99215 OFFICE O/P EST HI 40 MIN: CPT | Performed by: NURSE PRACTITIONER

## 2023-03-30 RX ORDER — MODAFINIL 100 MG/1
100 TABLET ORAL
Qty: 30 TABLET | Refills: 1 | Status: SHIPPED | OUTPATIENT
Start: 2023-03-30

## 2023-03-30 NOTE — PROGRESS NOTES
Rakesh Gonzalez (: 2000) is a 20 y.o. other, established patient, seen for hypersomnia follow up and medication management, Rakesh Gonzalez was last seen by Dr. Isma Villarreal on 2022, prior notes and sleep testing reviewed in detail. PSG/MSLY completed 2/15/2023 which showed no sleep apnea and mild hypersomnia. Weight at time of sleep testing 174 pounds. ASSESSMENT/PLAN:    ICD-10-CM ICD-9-CM    1. Hypersomnia  G47.10 780.54 modafiniL (PROVIGIL) 100 mg tablet      2. H/O: depression  Z86.59 V11.8       3. Adult BMI 28.0-28.9 kg/sq m  Z68.28 V85.24           Patient has a history and examination consistent with the diagnosis of hypersomnia. Follow-up and Dispositions    Return in about 5 weeks (around 2023) for medication follow up with  or [facility] - hypersomnia - we will trial 100 mg modafinil upon awakening for daytime sleepiness. *  reviewed today, controlled substance agreement emailed to patient for completion    * Urine drug screen 2/15/2023 - negative     * Medication side effects and contra-indications reviewed. Patient was informed of decreased efficacy of hormonal birth control and risks during pregnancy. Holliesana Lorenzovitaly reports no hormonal birth control and partner had a vasectomy. Orders Placed This Encounter    modafiniL (PROVIGIL) 100 mg tablet     Sig: Take 1 Tablet by mouth every morning. Max Daily Amount: 100 mg. Indications: for excessive daytime sleepiness     Dispense:  30 Tablet     Refill:  1       * Counseling was provided regarding the importance of proper sleep hygiene (sleep duration of 8.5 to 9 hours) and use of wakefulness promoting agents. * Patient was advised to exercise caution and vigilance while operating machinery and driving. * Patient was asked to contact our office at any time for further questions regarding their sleep symptoms. 2. H/O Depression - continue on Amando Dave's current regimen.      3. Encouraged continued weight management program through appropriate diet and exercise regimen as healthy weight has been shown to improve sleep quality. SUBJECTIVE/OBJECTIVE:    Results from sleep testing reviewed with patient today in detail. Patient reports of continued daytime sleepiness and is not able to get up easily each morning or after naps. They do feel tired during the day and report impairment in ability to perform daily activities especially in the evening. Sonja Smith denies of symptoms indicative of cataplexy, sleep paralysis, or hypnagogic hallucinations. Felicity Mercado reports that naps do not tend to help, it is difficult to wake up from them and they do not feel restorative. 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 We discussed both narcolepsy and hypersomnia including the diagnosis criteria. It is possible that the sleep testing did not show REM sleep periods during the naps due to the REM suppressing nature of anti-depressant medications. The average sleep latency of greater than 14 minutes is indicative of mild hypersomnia. We discussed medication treatment alternatives and Felicity Mercado would like to proceed with a trial of 100mg Modafinil. The patient's father is treated successfully with this mediation and Felicity Mercado is not interested in a CNS depressant medications such at [facility] at this time. CURRENT THERAPY:  None, UDS negative 2023    CURRENT SLEEP PATTERN:   Bedtime: 11pm - 1am   Sleep Onset: less than 5 minutes   Rise time: 7-9 am   Frequency of nocturnal awakenings: 0 per night that she remembers  Nappin-1 per week, duration   Aurora Score: 12, prior score 13    SLEEP TESTING:    PSG(2023): Apnea/Hypopnea index of 0.0 which indicates no significant sleep apnea. MSLT(2023): mild daytime sleepiness as evidenced by an average latency of 14:39 minutes, Stage REM was not noted in any naps. Patient fell asleep in all 5 naps. CO2 29 mmol/L on labs 3/2/2022    Sleep Review of Systems: notable for Negative difficulty falling asleep; Negative early morning headaches; Negative memory problems; Negative concentration issues; Negative chest pain; Negative shortness of breath; Negative significant joint pain at night; Negative significant muscle pain at night; Negative rashes or itching; Negative heartburn; Negative significant mood issues    Vitals reported by patient     Patient-Reported Vitals 3/27/2023   Patient-Reported Weight 165   Patient-Reported Temperature 97   Patient-Reported Systolic  556   Patient-Reported Diastolic 80   Patient-Reported LMP 02/15/23      Calculated BMI 28    Physical Exam completed by visual and auditory observation of patient with verbal input from patient. General:   Alert, oriented, not in acute distress   Eyes:  Anicteric Sclerae; no obvious strabismus   Nose:  No obvious nasal septum deviation    Neck:   Midline trachea, no visible mass   Chest/Lungs:  Respiratory effort normal, no visualized signs of difficulty breathing or respiratory distress   CVS:  No JVD   Extremities:  No obvious rashes noted on face, neck, or hands   Neuro:  No facial asymmetry, no focal deficits; no obvious tremor    Psych:  Normal affect,  normal countenance       Guera Flirt, was evaluated through a synchronous (real-time) audio-video encounter. The patient (or guardian if applicable) is aware that this is a billable service, which includes applicable co-pays. This Virtual Visit was conducted with patient's (and/or legal guardian's) consent. The visit was conducted pursuant to the emergency declaration under the [address] [place] [address] authority and the [employer] Act. Patient identification was verified, and a caregiver was present when appropriate.   The patient was located at: Home: [address] [facility] [address] [facility] [address]  The provider was located at: [facility] [address] [address] [address]      On this date 03/30/2023 I have spent 40 minutes reviewing previous notes, test results and face to face with the patient discussing the diagnosis and importance of compliance with the treatment plan as well as documenting on the day of the visit. Patient's phone number [phone] (cell) was confirmed for accuracy. Sonja Sarah gives permission for messages regarding results and appointments to be left at that number. An electronic signature was used to authenticate this note.     -- Edelmira Mohamud NP, [facility]  03/30/23

## 2023-03-30 NOTE — PATIENT INSTRUCTIONS
[address] [facility] [address] [place]. [place], [address]  Tel.  [phone]  Fax. 0316 [facility], [address] [facility]  Tel.  [phone]  Fax. [phone] [address]  Tel.  [phone]  Fax. [phone]     PROPER SLEEP HYGIENE    What to avoid  Do not have drinks with caffeine, such as coffee or black tea, for 8 hours before bed. Do not smoke or use other types of tobacco near bedtime. Nicotine is a stimulant and can keep you awake. Avoid drinking alcohol late in the evening, because it can cause you to wake in the middle of the night. Do not eat a big meal close to bedtime. If you are hungry, eat a light snack. Do not drink a lot of water close to bedtime, because the need to urinate may wake you up during the night. Do not read or watch TV in bed. Use the bed only for sleeping and sexual activity. What to try  Go to bed at the same time every night, and wake up at the same time every morning. Do not take naps during the day. Keep your bedroom quiet, dark, and cool. Get regular exercise, but not within 3 to 4 hours of your bedtime. .  Sleep on a comfortable pillow and mattress. If watching the clock makes you anxious, turn it facing away from you so you cannot see the time. If you worry when you lie down, start a worry book. Well before bedtime, write down your worries, and then set the book and your concerns aside. Try meditation or other relaxation techniques before you go to bed. If you cannot fall asleep, get up and go to another room until you feel sleepy. Do something relaxing. Repeat your bedtime routine before you go to bed again. Make your house quiet and calm about an hour before bedtime. Turn down the lights, turn off the TV, log off the computer, and turn down the volume on music. This can help you relax after a busy day.     Drowsy Driving  The [address] [place] [address] Road Traffic Safety Administration cites drowsiness as a causing factor in more than 874,339 police reported crashes annually, resulting in 76,000 injuries and 1,500 deaths. Other surveys suggest 55% of people polled have driven while drowsy in the past year, 23% had fallen asleep but not crashed, 3% crashed, and 2% had and accident due to drowsy driving. Who is at risk? Young Drivers: One study of drowsy driving accidents states that 55% of the drivers were under 25 years. Of those, 75% were male. Shift Workers and Travelers: People who work overnight or travel across time zones frequently are at higher risk of experiencing Circadian Rhythm Disorders. They are trying to work and function when their body is programed to sleep. Sleep Deprived: Lack of sleep has a serious impact on your ability to pay attention or focus on a task. Consistently getting less than the average of 8 hours your body needs creates partial or cumulative sleep deprivation. Untreated Sleep Disorders: Sleep Apnea, Narcolepsy, R.L.S., and other sleep disorders (untreated) prevent a person from getting enough restful sleep. This leads to excessive daytime sleepiness and increases the risk for drowsy driving accidents by up to 7 times. Medications / Alcohol: Even over the counter medications can cause drowsiness. Medications that impair a drivers attention should have a warning label. Alcohol naturally makes you sleepy and on its own can cause accidents. Combined with excessive drowsiness its effects are amplified. Signs of Drowsy Driving:   * You don't remember driving the last few miles   * You may drift out of your meek   * You are unable to focus and your thoughts wander   * You may yawn more often than normal   * You have difficulty keeping your eyes open / nodding off   * Missing traffic signs, speeding, or tailgating  Prevention-   Good sleep hygiene, lifestyle and behavioral choices have the most impact on drowsy driving.  There is no substitute for sleep and the average person requires 8 hours nightly. If you find yourself driving drowsy, stop and sleep. Consider the sleep hygiene tips provided during your visit as well. Medication Refill Policy: Refills for all medications require 1 week advance notice. Please have your pharmacy fax a refill request. We are unable to fax, or call in \"controled substance\" medications and you will need to pick these prescriptions up from our office. eCozy Activation    Thank you for requesting access to eCozy. Please follow the instructions below to securely access and download your online medical record. eCozy allows you to send messages to your doctor, view your test results, renew your prescriptions, schedule appointments, and more. How Do I Sign Up? In your internet browser, go to [email or web address]. Click on the First Time User? Click Here link in the Sign In box. You will see the New Member Sign Up page. Enter your eCozy Access Code exactly as it appears below. You will not need to use this code after youve completed the sign-up process. If you do not sign up before the expiration date, you must request a new code. eCozy Access Code: Activation code not generated  Current eCozy Status: Active (This is the date your eCozy access code will )    Enter the last four digits of your Social Security Number (xxxx) and Date of Birth (mm/dd/yyyy) as indicated and click Submit. You will be taken to the next sign-up page. Create a eCozy ID. This will be your eCozy login ID and cannot be changed, so think of one that is secure and easy to remember. Create a eCozy password. You can change your password at any time. Enter your Password Reset Question and Answer. This can be used at a later time if you forget your password. Enter your e-mail address. You will receive e-mail notification when new information is available in [address] Click Sign Up.  You can now view and download portions of your medical record. Click the Glass link to download a portable copy of your medical information. Additional Information    If you have questions, please call [phone]. Remember, [employer] is NOT to be used for urgent needs. For medical emergencies, dial 911.

## 2023-04-03 ENCOUNTER — TELEPHONE (OUTPATIENT)
Dept: SLEEP MEDICINE | Age: 23
End: 2023-04-03

## 2023-04-18 ENCOUNTER — PATIENT MESSAGE (OUTPATIENT)
Dept: SLEEP MEDICINE | Age: 23
End: 2023-04-18

## 2023-04-18 DIAGNOSIS — G47.10 HYPERSOMNIA: ICD-10-CM

## 2023-04-18 NOTE — TELEPHONE ENCOUNTER
Marcus Stefaniegreta (: 2000) last seen by me on 3/30/2023, previously seen by Dr. Jacinda Carlos on 2022, prior notes and sleep testing reviewed in detail. PSG/MSLY completed 2/15/2023 which showed no sleep apnea and mild hypersomnia. Weight at time of sleep testing 174 pounds. Patient sent medication refill request.  Script sent 3/30/23 for 100 mg modafinil to [facility], with 1 refill.  reviewed today and does not show any medications (Pharmacy may not have updated). The [employer] message sent to patient to confirm medication is working and no negative side effects. She is scheduled for follow up with Dr. Jacinda Carlos on 2023. Pharmacy should have 1 refill available to patient prior to visit.     Lucian Nowak NP, [facility]  23

## 2023-04-21 RX ORDER — MODAFINIL 100 MG/1
100 TABLET ORAL
Qty: 30 TABLET | Refills: 1 | OUTPATIENT
Start: 2023-04-21

## 2023-05-05 ENCOUNTER — TELEPHONE (OUTPATIENT)
Age: 23
End: 2023-05-05

## 2023-05-05 NOTE — TELEPHONE ENCOUNTER
Lvm for pt to cb to confirm upcoming appointment, also reminded patient to bring  list of medications and to arrive 15-20 minutes early. All due to transition to new system.

## 2023-05-11 ENCOUNTER — OFFICE VISIT (OUTPATIENT)
Age: 23
End: 2023-05-11
Payer: COMMERCIAL

## 2023-05-11 VITALS
WEIGHT: 179 LBS | OXYGEN SATURATION: 95 % | DIASTOLIC BLOOD PRESSURE: 82 MMHG | HEART RATE: 87 BPM | BODY MASS INDEX: 29.82 KG/M2 | HEIGHT: 65 IN | SYSTOLIC BLOOD PRESSURE: 119 MMHG

## 2023-05-11 DIAGNOSIS — G47.10 HYPERSOMNIA, UNSPECIFIED: Primary | ICD-10-CM

## 2023-05-11 PROCEDURE — 99213 OFFICE O/P EST LOW 20 MIN: CPT | Performed by: INTERNAL MEDICINE

## 2023-05-11 RX ORDER — MODAFINIL 100 MG/1
100 TABLET ORAL DAILY
Qty: 30 TABLET | Refills: 5 | Status: SHIPPED | OUTPATIENT
Start: 2023-05-11 | End: 2023-06-10

## 2023-05-11 ASSESSMENT — SLEEP AND FATIGUE QUESTIONNAIRES
HOW LIKELY ARE YOU TO NOD OFF OR FALL ASLEEP IN A CAR, WHILE STOPPED FOR A FEW MINUTES IN TRAFFIC: 0
HOW LIKELY ARE YOU TO NOD OFF OR FALL ASLEEP WHILE SITTING INACTIVE IN A PUBLIC PLACE: 0
DO YOU HAVE DIFFICULTY OPERATING A MOTOR VEHICLE FOR LONG DISTANCES (GREATER THAN 100 MILES) BECAUSE YOU BECOME SLEEPY: NO
HOW LIKELY ARE YOU TO NOD OFF OR FALL ASLEEP WHILE SITTING INACTIVE IN A PUBLIC PLACE: WOULD NEVER DOZE
DO YOU HAVE DIFFICULTY BEING AS ACTIVE AS YOU WANT TO BE IN THE EVENING BECAUSE YOU ARE SLEEPY OR TIRED: YES, LITTLE
HAS YOUR RELATIONSHIP WITH FAMILY, FRIENDS OR WORK COLLEAGUES BEEN AFFECTED BECAUSE YOU ARE SLEEPY OR TIRED: YES, A LITTLE
DO YOU HAVE DIFFICULTY OPERATING A MOTOR VEHICLE FOR SHORT DISTANCES (LESS THAN 100 MILES) BECAUSE YOU BECOME SLEEPY: NO
DO YOU HAVE DIFFICULTY CONCENTRATING ON THE THINGS YOU DO BECAUSE YOU ARE SLEEPY OR TIRED: YES, A LITTLE
HOW LIKELY ARE YOU TO NOD OFF OR FALL ASLEEP WHILE SITTING AND READING: SLIGHT CHANCE OF DOZING
HOW LIKELY ARE YOU TO NOD OFF OR FALL ASLEEP WHILE SITTING QUIETLY AFTER LUNCH WITHOUT ALCOHOL: WOULD NEVER DOZE
HOW LIKELY ARE YOU TO NOD OFF OR FALL ASLEEP WHILE SITTING AND TALKING TO SOMEONE: 1
DO YOU HAVE DIFFICULTY BEING AS ACTIVE AS YOU WANT TO BE IN THE MORNING BECAUSE YOU ARE SLEEPY OR TIRED: YES, LITTLE
HOW LIKELY ARE YOU TO NOD OFF OR FALL ASLEEP WHILE SITTING QUIETLY AFTER LUNCH WITHOUT ALCOHOL: 0
HOW LIKELY ARE YOU TO NOD OFF OR FALL ASLEEP WHILE LYING DOWN TO REST IN THE AFTERNOON WHEN CIRCUMSTANCES PERMIT: 2
HOW LIKELY ARE YOU TO NOD OFF OR FALL ASLEEP WHILE SITTING AND TALKING TO SOMEONE: SLIGHT CHANCE OF DOZING
HOW LIKELY ARE YOU TO NOD OFF OR FALL ASLEEP WHILE WATCHING TV: 1
FOSQ SCORE: 16.5
ESS TOTAL SCORE: 7
HOW LIKELY ARE YOU TO NOD OFF OR FALL ASLEEP IN A CAR, WHILE STOPPED FOR A FEW MINUTES IN TRAFFIC: WOULD NEVER DOZE
HOW LIKELY ARE YOU TO NOD OFF OR FALL ASLEEP WHEN YOU ARE A PASSENGER IN A CAR FOR AN HOUR WITHOUT A BREAK: MODERATE CHANCE OF DOZING
HOW LIKELY ARE YOU TO NOD OFF OR FALL ASLEEP WHEN YOU ARE A PASSENGER IN A CAR FOR AN HOUR WITHOUT A BREAK: 2
DO YOU HAVE DIFFICULTY VISITING YOUR FAMILY OR FRIENDS IN THEIR HOME BECAUSE YOU BECOME SLEEPY OR TIRED: NO
HOW LIKELY ARE YOU TO NOD OFF OR FALL ASLEEP WHILE LYING DOWN TO REST IN THE AFTERNOON WHEN CIRCUMSTANCES PERMIT: MODERATE CHANCE OF DOZING
DO YOU HAVE DIFFICULTY WATCHING A MOVIE OR VIDEO BECAUSE YOU BECOME SLEEPY OR TIRED: YES, A LITTLE
HAS YOUR MOOD BEEN AFFECTED BECAUSE YOU ARE SLEEPY OR TIRED: YES, LITTLE
DO YOU GENERALLY HAVE DIFFICULTY REMEMBERING THINGS BECAUSE YOU ARE SLEEPY OR TIRED: YES, A LITTLE
HOW LIKELY ARE YOU TO NOD OFF OR FALL ASLEEP WHILE SITTING AND READING: 1
HOW LIKELY ARE YOU TO NOD OFF OR FALL ASLEEP WHILE WATCHING TV: SLIGHT CHANCE OF DOZING

## 2023-05-11 NOTE — PATIENT INSTRUCTIONS
requires 8 hours nightly. If you find yourself driving drowsy, stop and sleep. Consider the sleep hygiene tips provided during your visit as well. Medication Refill Policy: Refills for all medications require 1 week advance notice. Please have your pharmacy fax a refill request. We are unable to fax, or call in \"controled substance\" medications and you will need to pick these prescriptions up from our office.

## 2023-05-11 NOTE — PROGRESS NOTES
[address] [facility]. [place], [address]  Tel.  [phone]  Fax. 100 [facility] 60  [place], [address] [facility]  Tel.  [phone]  Fax. [phone] 9[address] [facility] Fili Spence   Tel.  [phone]  Fax. [phone]     S>Catarina Rendon is a 20 y.o. adult seen for Sleep Problem (5 week RX F/U)    She feels modafinil has significantly reduced her daytime sleepiness (by at least 75%). She quit her [employer] job and is still going to school and working at [employer]. She denies any adverse effects from modafinil. (Her fiance has had a vasectomy. She does not take OCP's). No Known Allergies    Torreslisa Rose has a current medication list which includes the following prescription(s): escitalopram and modafinil. Ruchi Rendon  has a past medical history of Depression, Fibromyalgia, Former smoker, stopped smoking in distant past, History of fatigue, History of ovarian cyst, Hyperlipidemia, and Vitamin D deficiency. Sleep Medicine 5/11/2023 3/27/2023 3/2/2023 12/19/2022   Sitting and reading 1 2 2 2   Watching TV 1 3 3 3   Sitting, inactive in a public place (e.g. a theatre or a meeting) 0 1 1 1   As a passenger in a car for an hour without a break 2 2 3 2   Lying down to rest in the afternoon when circumstances permit 2 3 3 3   Sitting and talking to someone 1 1 1 1   Sitting quietly after a lunch without alcohol 0 0 1 1   In a car, while stopped for a few minutes in traffic 0 0 0 0   Pep Sleepiness Score 7 12 14 13        O>    /82 (Site: Left Upper Arm, Position: Sitting, Cuff Size: Medium Adult)   Pulse 87   Ht 5' 5\" (1.651 m)   Wt 179 lb (81.2 kg)   SpO2 95%   BMI 29.79 kg/m²         General:   Alert, oriented, not in distress   Neck:   No JVD    Chest/Lungs:  symetrical lung expansion , no accessory muscle use    Extremities:  no obvious rashes , negative edema    Neuro:  No focal deficits ;  No obvious tremor    Psych:  Normal affect ,  Normal

## 2023-07-10 ENCOUNTER — OFFICE VISIT (OUTPATIENT)
Age: 23
End: 2023-07-10
Payer: COMMERCIAL

## 2023-07-10 VITALS
HEIGHT: 65 IN | HEART RATE: 73 BPM | RESPIRATION RATE: 18 BRPM | DIASTOLIC BLOOD PRESSURE: 81 MMHG | TEMPERATURE: 98.4 F | BODY MASS INDEX: 29.66 KG/M2 | OXYGEN SATURATION: 98 % | SYSTOLIC BLOOD PRESSURE: 114 MMHG | WEIGHT: 178 LBS

## 2023-07-10 DIAGNOSIS — F41.1 GENERALIZED ANXIETY DISORDER: ICD-10-CM

## 2023-07-10 DIAGNOSIS — G47.19 EXCESSIVE DAYTIME SLEEPINESS: ICD-10-CM

## 2023-07-10 DIAGNOSIS — F33.0 MILD EPISODE OF RECURRENT MAJOR DEPRESSIVE DISORDER (HCC): Primary | ICD-10-CM

## 2023-07-10 PROCEDURE — 99203 OFFICE O/P NEW LOW 30 MIN: CPT | Performed by: STUDENT IN AN ORGANIZED HEALTH CARE EDUCATION/TRAINING PROGRAM

## 2023-07-10 RX ORDER — MODAFINIL 100 MG/1
100 TABLET ORAL DAILY
COMMUNITY

## 2023-07-10 RX ORDER — ESCITALOPRAM OXALATE 20 MG/1
20 TABLET ORAL DAILY
Qty: 90 TABLET | Refills: 1 | Status: SHIPPED | OUTPATIENT
Start: 2023-07-10

## 2023-07-10 SDOH — ECONOMIC STABILITY: HOUSING INSECURITY: IN THE LAST 12 MONTHS, HOW MANY PLACES HAVE YOU LIVED?: 2

## 2023-07-10 SDOH — ECONOMIC STABILITY: TRANSPORTATION INSECURITY
IN THE PAST 12 MONTHS, HAS THE LACK OF TRANSPORTATION KEPT YOU FROM MEDICAL APPOINTMENTS OR FROM GETTING MEDICATIONS?: NO

## 2023-07-10 SDOH — HEALTH STABILITY: PHYSICAL HEALTH: ON AVERAGE, HOW MANY DAYS PER WEEK DO YOU ENGAGE IN MODERATE TO STRENUOUS EXERCISE (LIKE A BRISK WALK)?: 4 DAYS

## 2023-07-10 SDOH — ECONOMIC STABILITY: FOOD INSECURITY: WITHIN THE PAST 12 MONTHS, YOU WORRIED THAT YOUR FOOD WOULD RUN OUT BEFORE YOU GOT MONEY TO BUY MORE.: NEVER TRUE

## 2023-07-10 SDOH — ECONOMIC STABILITY: TRANSPORTATION INSECURITY
IN THE PAST 12 MONTHS, HAS LACK OF TRANSPORTATION KEPT YOU FROM MEETINGS, WORK, OR FROM GETTING THINGS NEEDED FOR DAILY LIVING?: NO

## 2023-07-10 SDOH — ECONOMIC STABILITY: INCOME INSECURITY: IN THE LAST 12 MONTHS, WAS THERE A TIME WHEN YOU WERE NOT ABLE TO PAY THE MORTGAGE OR RENT ON TIME?: NO

## 2023-07-10 SDOH — ECONOMIC STABILITY: HOUSING INSECURITY
IN THE LAST 12 MONTHS, WAS THERE A TIME WHEN YOU DID NOT HAVE A STEADY PLACE TO SLEEP OR SLEPT IN A SHELTER (INCLUDING NOW)?: NO

## 2023-07-10 SDOH — ECONOMIC STABILITY: FOOD INSECURITY: WITHIN THE PAST 12 MONTHS, THE FOOD YOU BOUGHT JUST DIDN'T LAST AND YOU DIDN'T HAVE MONEY TO GET MORE.: NEVER TRUE

## 2023-07-10 SDOH — ECONOMIC STABILITY: INCOME INSECURITY: HOW HARD IS IT FOR YOU TO PAY FOR THE VERY BASICS LIKE FOOD, HOUSING, MEDICAL CARE, AND HEATING?: NOT HARD AT ALL

## 2023-07-10 SDOH — HEALTH STABILITY: PHYSICAL HEALTH: ON AVERAGE, HOW MANY MINUTES DO YOU ENGAGE IN EXERCISE AT THIS LEVEL?: 30 MIN

## 2023-07-10 ASSESSMENT — PATIENT HEALTH QUESTIONNAIRE - PHQ9
1. LITTLE INTEREST OR PLEASURE IN DOING THINGS: 0
SUM OF ALL RESPONSES TO PHQ9 QUESTIONS 1 & 2: 0
3. TROUBLE FALLING OR STAYING ASLEEP: 0
4. FEELING TIRED OR HAVING LITTLE ENERGY: 0
SUM OF ALL RESPONSES TO PHQ QUESTIONS 1-9: 0
7. TROUBLE CONCENTRATING ON THINGS, SUCH AS READING THE NEWSPAPER OR WATCHING TELEVISION: 0
9. THOUGHTS THAT YOU WOULD BE BETTER OFF DEAD, OR OF HURTING YOURSELF: 0
8. MOVING OR SPEAKING SO SLOWLY THAT OTHER PEOPLE COULD HAVE NOTICED. OR THE OPPOSITE, BEING SO FIGETY OR RESTLESS THAT YOU HAVE BEEN MOVING AROUND A LOT MORE THAN USUAL: 0
SUM OF ALL RESPONSES TO PHQ QUESTIONS 1-9: 0
6. FEELING BAD ABOUT YOURSELF - OR THAT YOU ARE A FAILURE OR HAVE LET YOURSELF OR YOUR FAMILY DOWN: 0
2. FEELING DOWN, DEPRESSED OR HOPELESS: 0
5. POOR APPETITE OR OVEREATING: 0
10. IF YOU CHECKED OFF ANY PROBLEMS, HOW DIFFICULT HAVE THESE PROBLEMS MADE IT FOR YOU TO DO YOUR WORK, TAKE CARE OF THINGS AT HOME, OR GET ALONG WITH OTHER PEOPLE: 0

## 2023-07-10 ASSESSMENT — SOCIAL DETERMINANTS OF HEALTH (SDOH)
DO YOU BELONG TO ANY CLUBS OR ORGANIZATIONS SUCH AS CHURCH GROUPS UNIONS, FRATERNAL OR ATHLETIC GROUPS, OR SCHOOL GROUPS?: YES
HOW OFTEN DO YOU ATTENT MEETINGS OF THE CLUB OR ORGANIZATION YOU BELONG TO?: MORE THAN 4 TIMES PER YEAR
ARE YOU MARRIED, WIDOWED, DIVORCED, SEPARATED, NEVER MARRIED, OR LIVING WITH A PARTNER?: LIVING WITH PARTNER
HOW OFTEN DO YOU ATTEND CHURCH OR RELIGIOUS SERVICES?: NEVER
WITHIN THE LAST YEAR, HAVE TO BEEN RAPED OR FORCED TO HAVE ANY KIND OF SEXUAL ACTIVITY BY YOUR PARTNER OR EX-PARTNER?: NO
WITHIN THE LAST YEAR, HAVE YOU BEEN HUMILIATED OR EMOTIONALLY ABUSED IN OTHER WAYS BY YOUR PARTNER OR EX-PARTNER?: NO
WITHIN THE LAST YEAR, HAVE YOU BEEN KICKED, HIT, SLAPPED, OR OTHERWISE PHYSICALLY HURT BY YOUR PARTNER OR EX-PARTNER?: NO
WITHIN THE LAST YEAR, HAVE YOU BEEN AFRAID OF YOUR PARTNER OR EX-PARTNER?: NO
HOW OFTEN DO YOU GET TOGETHER WITH FRIENDS OR RELATIVES?: MORE THAN THREE TIMES A WEEK
IN A TYPICAL WEEK, HOW MANY TIMES DO YOU TALK ON THE PHONE WITH FAMILY, FRIENDS, OR NEIGHBORS?: MORE THAN THREE TIMES A WEEK

## 2023-07-10 ASSESSMENT — LIFESTYLE VARIABLES
HOW MANY STANDARD DRINKS CONTAINING ALCOHOL DO YOU HAVE ON A TYPICAL DAY: PATIENT DOES NOT DRINK
HOW OFTEN DO YOU HAVE A DRINK CONTAINING ALCOHOL: NEVER

## 2023-07-10 NOTE — PROGRESS NOTES
Name and Date of Birth Verified    Previous PCP:  Dr. Rose Marie Dudley She    Pharmacy Verified    Chief Complaint   Patient presents with    New Patient    Establish Care     Fasting for lab, [facility]. 1. \"Have you been to the ER, urgent care clinic since your last visit? Hospitalized since your last visit? \" No    2. \"Have you seen or consulted any other health care providers outside of the [address] since your last visit? \"     Yes   Dr. Joey Goins  Therapist ( [address])    3. For patients aged 43-73: Has the patient had a colonoscopy / FIT/ Cologuard? N/A      If the patient is female:    4. For patients aged 43-66: Has the patient had a mammogram within the past 2 years? N/A      5. For patients aged 21-65: Has the patient had a pap smear?      [address] [facility] 2022    Health Maintenance Due   Topic Date Due    Hepatitis A vaccine (1 of 2 - Risk 2-dose series) Never done    Varicella vaccine (1 of 2 - 2-dose childhood series) Never done    HPV vaccine (1 - 2-dose series) Never done    Chlamydia/GC screen  Never done    Hepatitis B vaccine (1 of 3 - Risk 3-dose series) Never done    Pap smear  Never done    COVID-19 Vaccine (4 - Booster for Lit Ralph series) 01/11/2022

## 2023-07-10 NOTE — PROGRESS NOTES
[address] [facility] [address]  [address] RANDA Cadet, [address] [place] [address]  [phone]    C/C: Medication management and establish care    HPI:    Becky Nguyen is a 20 y.o. White (non-)  adult who presents to clinic today for evaluation of the issues listed above. Pt is new to the practice    Previous PCP:  Dr. Kathleen Larson. Provider left the practice. Subjective; Patient presenting for initial office visit with me today. Depression/anxiety:  On Lexapro 20 mg daily. Previously on Cymbalta but it made her drowsy and affected her sleep. Excessive daytime sleepiness - follows with Sleep medicine, on Modafinil 100 mg daily    Pt denies any fever, chill, chest pain, SOB, abdominal pain, n/v/d, HA or dizziness. 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 No children    Other Specialists/providers:  Sleep med.   Ophthalmologist  Dentist  Therapist    Allergies- reviewed:   No Known Allergies    Past Medical History- reviewed:  Past Medical History:   Diagnosis Date    Depression     02/2022: therapy    Fibromyalgia 02/2022    Former smoker, stopped smoking in distant past     History of fatigue 02/2022    History of ovarian cyst     Hyperlipidemia 03/2022    diet controlled, .2 mg/dL    Vitamin D deficiency        Family History - reviewed:  Family History   Problem Relation Age of Onset    Diabetes Maternal Grandfather     Hypertension Mother     Sleep Apnea Father        Social History - reviewed:  Social History     Socioeconomic History    Marital status: Life Partner     Spouse name: Not on file    Number of children: Not on file    Years of education: Not on file    Highest education level: Not on file   Occupational History    Not on file   Tobacco Use    Smoking status: Former     Passive exposure: Never    Smokeless tobacco: Former   Vaping

## 2023-09-15 ENCOUNTER — TELEPHONE (OUTPATIENT)
Age: 23
End: 2023-09-15

## 2023-09-15 NOTE — TELEPHONE ENCOUNTER
Patient sent [employer] message regarding symptoms returning. Schedule follow up, in person.  Can be with me or NP.

## 2023-09-21 ENCOUNTER — TELEPHONE (OUTPATIENT)
Age: 23
End: 2023-09-21

## 2023-09-21 NOTE — TELEPHONE ENCOUNTER
Patient phoned the office to schedule a sooner appointment for medication follow up.   Will a vv suffice, or does she need to have a f2f?

## 2023-10-19 ENCOUNTER — TELEPHONE (OUTPATIENT)
Age: 23
End: 2023-10-19

## 2023-10-19 NOTE — TELEPHONE ENCOUNTER
Called patient to get her scheduled for a new patient appointment with [facility].  LVM to call back

## 2023-10-21 ENCOUNTER — TELEPHONE (OUTPATIENT)
Age: 23
End: 2023-10-21

## 2023-10-21 NOTE — TELEPHONE ENCOUNTER
Patient states she is out of town of date of follow up. She can come in week before for vitals check and we can do visit virtually as long as she is in [address] [place] [address].      Will need weight, heart rate and BP and routine urine drug screen

## 2024-01-10 ENCOUNTER — OFFICE VISIT (OUTPATIENT)
Age: 24
End: 2024-01-10
Payer: COMMERCIAL

## 2024-01-10 VITALS
HEART RATE: 92 BPM | RESPIRATION RATE: 18 BRPM | TEMPERATURE: 98.7 F | SYSTOLIC BLOOD PRESSURE: 121 MMHG | DIASTOLIC BLOOD PRESSURE: 81 MMHG | OXYGEN SATURATION: 99 % | HEIGHT: 65 IN | BODY MASS INDEX: 29.75 KG/M2 | WEIGHT: 178.57 LBS

## 2024-01-10 DIAGNOSIS — F41.1 GENERALIZED ANXIETY DISORDER: ICD-10-CM

## 2024-01-10 DIAGNOSIS — F33.0 MILD EPISODE OF RECURRENT MAJOR DEPRESSIVE DISORDER (HCC): ICD-10-CM

## 2024-01-10 PROCEDURE — 99213 OFFICE O/P EST LOW 20 MIN: CPT | Performed by: STUDENT IN AN ORGANIZED HEALTH CARE EDUCATION/TRAINING PROGRAM

## 2024-01-10 RX ORDER — ESCITALOPRAM OXALATE 20 MG/1
20 TABLET ORAL DAILY
Qty: 90 TABLET | Refills: 1 | Status: SHIPPED | OUTPATIENT
Start: 2024-01-10

## 2024-01-10 RX ORDER — MODAFINIL 200 MG/1
200 TABLET ORAL DAILY
COMMUNITY

## 2024-01-10 ASSESSMENT — PATIENT HEALTH QUESTIONNAIRE - PHQ9
4. FEELING TIRED OR HAVING LITTLE ENERGY: 0
6. FEELING BAD ABOUT YOURSELF - OR THAT YOU ARE A FAILURE OR HAVE LET YOURSELF OR YOUR FAMILY DOWN: 0
SUM OF ALL RESPONSES TO PHQ QUESTIONS 1-9: 0
2. FEELING DOWN, DEPRESSED OR HOPELESS: 0
8. MOVING OR SPEAKING SO SLOWLY THAT OTHER PEOPLE COULD HAVE NOTICED. OR THE OPPOSITE, BEING SO FIGETY OR RESTLESS THAT YOU HAVE BEEN MOVING AROUND A LOT MORE THAN USUAL: 0
3. TROUBLE FALLING OR STAYING ASLEEP: 0
7. TROUBLE CONCENTRATING ON THINGS, SUCH AS READING THE NEWSPAPER OR WATCHING TELEVISION: 0
5. POOR APPETITE OR OVEREATING: 0
SUM OF ALL RESPONSES TO PHQ QUESTIONS 1-9: 0
10. IF YOU CHECKED OFF ANY PROBLEMS, HOW DIFFICULT HAVE THESE PROBLEMS MADE IT FOR YOU TO DO YOUR WORK, TAKE CARE OF THINGS AT HOME, OR GET ALONG WITH OTHER PEOPLE: 0
9. THOUGHTS THAT YOU WOULD BE BETTER OFF DEAD, OR OF HURTING YOURSELF: 0
SUM OF ALL RESPONSES TO PHQ QUESTIONS 1-9: 0
SUM OF ALL RESPONSES TO PHQ QUESTIONS 1-9: 0

## 2024-01-10 NOTE — PROGRESS NOTES
Chief Complaint   Patient presents with    Follow-up     7/10/2023 Depression     \"Have you been to the ER, urgent care clinic since your last visit?  Hospitalized since your last visit?\"      Yes  Urgent Care  10/2023  Left Ankle Sprain    “Have you seen or consulted any other health care providers outside of [facility] since your last visit?”    NO             There were no vitals filed for this visit.  Health Maintenance Due   Topic Date Due    Hepatitis B vaccine (1 of 3 - 3-dose series) Never done    Varicella vaccine (1 of 2 - 2-dose childhood series) Never done    HPV vaccine (1 - 2-dose series) Never done    Chlamydia/GC screen  Never done    Hepatitis A vaccine (1 of 2 - Risk 2-dose series) Never done    Pap smear  Never done    Flu vaccine (1) 2023    COVID-19 Vaccine ( season) 2023      The patient, Catarina Rendon, identity was verified by name and , pharmacy verified  Labs:Yes  Fasting:No

## 2024-01-10 NOTE — PROGRESS NOTES
[facility]Y [facility]  [address] [place] [address] [phone]    C/C: Depression    HPI:    Catarina Rendon is a 23 y.o.  White (non-)  adult who presents to clinic today for evaluation of the issues listed above.     Subjective;    Depression/anxiety:  On Lexapro 20 mg daily. Doing well on this dose.  No SI/HI.  Previously on Cymbalta but it made her drowsy and affected her sleep.    Excessive daytime sleepiness - follows with Sleep medicine, on Modafinil 200 mg daily    Pt denies any fever, chill, chest pain, SOB, abdominal pain, n/v/d, HA or dizziness.     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  No children    Other Specialists/providers:  Sleep med.  Ophthalmologist  Dentist  Therapist    Allergies- reviewed:   No Known Allergies    Past Medical History- reviewed:  Past Medical History:   Diagnosis Date    Depression     02/2022: therapy    Fibromyalgia 02/2022    Former smoker, stopped smoking in distant past     History of fatigue 02/2022    History of ovarian cyst     Hyperlipidemia 03/2022    diet controlled, .2 mg/dL    Vitamin D deficiency        Family History - reviewed:  Family History   Problem Relation Age of Onset    Diabetes Maternal Grandfather     Hypertension Mother     Sleep Apnea Father        Social History - reviewed:  Social History     Socioeconomic History    Marital status: Life Partner     Spouse name: Not on file    Number of children: Not on file    Years of education: Not on file    Highest education level: Not on file   Occupational History    Not on file   Tobacco Use    Smoking status: Former     Passive exposure: Never    Smokeless tobacco: Former   Vaping Use    Vaping Use: Former    Substances: Nicotine, THC, CBD, Flavoring    Devices: Disposable   Substance and Sexual

## 2024-03-18 DIAGNOSIS — F41.1 GENERALIZED ANXIETY DISORDER: ICD-10-CM

## 2024-03-18 DIAGNOSIS — F33.0 MILD EPISODE OF RECURRENT MAJOR DEPRESSIVE DISORDER (HCC): ICD-10-CM

## 2024-05-07 DIAGNOSIS — F41.1 GENERALIZED ANXIETY DISORDER: ICD-10-CM

## 2024-05-07 DIAGNOSIS — F33.0 MILD EPISODE OF RECURRENT MAJOR DEPRESSIVE DISORDER (HCC): ICD-10-CM

## 2024-05-08 NOTE — TELEPHONE ENCOUNTER
Patient comment: Tolerated well! Better than previous meds.     Last appointment: 1/10/24  Next appointment: 7/11/24  Previous refill encounter(s): 3/18/24 #90    Requested Prescriptions     Pending Prescriptions Disp Refills    sertraline (ZOLOFT) 50 MG tablet 90 tablet 0     Sig: TAKE 1 TABLET DAILY. INCREASE TO 2 TABLETS DAILY AFTER 1-2 WEEKS IF TOLERATED         For Pharmacy Admin Tracking Only    Program: Medication Refill  CPA in place:    Recommendation Provided To:   Intervention Detail: New Rx: 1, reason: Patient Preference  Intervention Accepted By:   Gap Closed?:    Time Spent (min): 5

## 2024-06-26 DIAGNOSIS — F41.1 GENERALIZED ANXIETY DISORDER: ICD-10-CM

## 2024-06-26 DIAGNOSIS — F33.0 MILD EPISODE OF RECURRENT MAJOR DEPRESSIVE DISORDER (HCC): ICD-10-CM

## 2024-06-26 NOTE — TELEPHONE ENCOUNTER
Last appointment: 1/10/24  Next appointment: 7/11/24  Previous refill encounter(s): 5/8/24 #90    Requested Prescriptions     Pending Prescriptions Disp Refills    sertraline (ZOLOFT) 50 MG tablet 90 tablet 0     Sig: TAKE 1 TABLET DAILY. INCREASE TO 2 TABLETS DAILY AFTER 1-2 WEEKS IF TOLERATED         For Pharmacy Admin Tracking Only    Program: Medication Refill  CPA in place:    Recommendation Provided To:   Intervention Detail: New Rx: 1, reason: Patient Preference  Intervention Accepted By:   Gap Closed?:    Time Spent (min): 5

## 2024-07-11 ENCOUNTER — OFFICE VISIT (OUTPATIENT)
Age: 24
End: 2024-07-11
Payer: COMMERCIAL

## 2024-07-11 VITALS
TEMPERATURE: 96.9 F | WEIGHT: 179.9 LBS | DIASTOLIC BLOOD PRESSURE: 86 MMHG | HEART RATE: 97 BPM | HEIGHT: 65 IN | RESPIRATION RATE: 18 BRPM | OXYGEN SATURATION: 99 % | BODY MASS INDEX: 29.97 KG/M2 | SYSTOLIC BLOOD PRESSURE: 120 MMHG

## 2024-07-11 DIAGNOSIS — G43.709 CHRONIC MIGRAINE WITHOUT AURA WITHOUT STATUS MIGRAINOSUS, NOT INTRACTABLE: Primary | ICD-10-CM

## 2024-07-11 PROCEDURE — 99214 OFFICE O/P EST MOD 30 MIN: CPT | Performed by: STUDENT IN AN ORGANIZED HEALTH CARE EDUCATION/TRAINING PROGRAM

## 2024-07-11 RX ORDER — DESVENLAFAXINE 25 MG/1
25 TABLET, EXTENDED RELEASE ORAL DAILY
COMMUNITY
Start: 2024-07-01

## 2024-07-11 RX ORDER — NORTRIPTYLINE HYDROCHLORIDE 10 MG/1
10 CAPSULE ORAL NIGHTLY
Qty: 30 CAPSULE | Refills: 2 | Status: SHIPPED | OUTPATIENT
Start: 2024-07-11 | End: 2024-07-11 | Stop reason: ALTCHOICE

## 2024-07-11 RX ORDER — PROPRANOLOL HCL 60 MG
60 CAPSULE, EXTENDED RELEASE 24HR ORAL
Qty: 30 CAPSULE | Refills: 2 | Status: SHIPPED | OUTPATIENT
Start: 2024-07-11

## 2024-07-11 RX ORDER — BUTALBITAL, ACETAMINOPHEN AND CAFFEINE 50; 325; 40 MG/1; MG/1; MG/1
1 TABLET ORAL EVERY 6 HOURS PRN
Qty: 30 TABLET | Refills: 0 | Status: SHIPPED | OUTPATIENT
Start: 2024-07-11 | End: 2024-07-11 | Stop reason: ALTCHOICE

## 2024-07-11 RX ORDER — SUMATRIPTAN 100 MG/1
TABLET, FILM COATED ORAL
Qty: 18 TABLET | Refills: 0 | Status: SHIPPED | OUTPATIENT
Start: 2024-07-11

## 2024-07-11 SDOH — ECONOMIC STABILITY: FOOD INSECURITY: WITHIN THE PAST 12 MONTHS, YOU WORRIED THAT YOUR FOOD WOULD RUN OUT BEFORE YOU GOT MONEY TO BUY MORE.: NEVER TRUE

## 2024-07-11 SDOH — ECONOMIC STABILITY: INCOME INSECURITY: HOW HARD IS IT FOR YOU TO PAY FOR THE VERY BASICS LIKE FOOD, HOUSING, MEDICAL CARE, AND HEATING?: NOT HARD AT ALL

## 2024-07-11 SDOH — ECONOMIC STABILITY: FOOD INSECURITY: WITHIN THE PAST 12 MONTHS, THE FOOD YOU BOUGHT JUST DIDN'T LAST AND YOU DIDN'T HAVE MONEY TO GET MORE.: NEVER TRUE

## 2024-07-11 NOTE — PROGRESS NOTES
Chief Complaint   Patient presents with    Follow-up     6 Months [birth year] Mild episode of recurrent major depressive disorder     \"Have you been to the ER, urgent care clinic since your last visit?  Hospitalized since your last visit?\"    NO    “Have you seen or consulted any other health care providers outside of [facility] since your last visit?”      Yes  Sleep Spec.  2024  Sleep Disorder        “Have you had a pap smear?”    NO     Last GYN visit ( Looking foe new Provider, last GYN left Practice)          Vitals:    24 0853   BP: 120/86   Pulse: 97   Resp: 18   Temp: 96.9 °F (36.1 °C)   SpO2: 99%     Health Maintenance Due   Topic Date Due    Chlamydia/GC screen  Never done    Pap smear  Never done      The patient, Catarina Rendon, identity was verified by name and , pharmacy verified  Labs:Yes  Fasting:Yes

## 2024-07-11 NOTE — ASSESSMENT & PLAN NOTE
Presents with migraine-like features. Patient's neurological exam is unremarkable. Patient reassured that neurodiagnostic workup not indicated at this time  -Will start abortive and ppx therapy  -Advised to keep a migraine log, you may like to try an mary like migraine lynn  -We discussed  migreleif which is a natural supplement that has been shown to reduce frequency severity or duration of the migraine headaches.  -We discussed the importance of a proper diet including plenty of fruits and vegetables as this can help with headache prevention.  -We discussed the importance of staying hydrated and drinking at least 32 to 64 ounces of water daily as this can be a cause of tension type headaches.  -We discussed the importance of  relaxation training, sleep hygiene and attempting to get at least 6 to 8 hours of sleep daily to help with headache prevention.  - Recommend pt to lie in darkened room and apply cold packs prn for pain, side effect profile discussed in detail.  -Discussed warning signs with pt including systemic symptoms like fever, neurologic signs/sx, worsening pain. To call office or go to ED should these happen.  - Will refer to neuro if persistent despite measures above

## 2024-07-11 NOTE — PROGRESS NOTES
[facility]  [address] [place] [address] [phone].    HPI:    Catarina Rendon is a 23 y.o.  White (non-)  adult who presents to clinic today for evaluation of the issues listed above.     PMHx: Depression/Anxiety and hypersomnia. Here for follow up    Subjective;    Headache:  Patient reports persistent headache over the past 4 months.    They are generally throbbing unilateral headache usually on the right side.   Endorse photophobia and phonophobia. No visual change.  Occurs every other day. Excedrin does help sometimes.   Headache does not wake patient up at night.   No history of migraine     No history of head trauma. No fever, chills, cough, or other URI symptoms. No jaw claudication or focal neurologic deficit.  Denies any weight loss, change of appetite or constitutional symptoms of polymyalgia rheumatica .       -Ophthalmology: wear reading glasses.    Other chronic problems    Depression/anxiety:  On  Prestiq.  Follows with psych    Hypersomnia - follows with Sleep medicine, on Modafinil 200 mg daily    Pt denies any fever, chill, chest pain, SOB, abdominal pain, n/v/d, HA or dizziness.     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  Ophthalmologist  Dentist  Therapist    Allergies- reviewed:   No Known Allergies    Past Medical History- reviewed:  Past Medical History:   Diagnosis Date    Depression     02/2022: therapy    Fibromyalgia 02/2022    Former smoker, stopped smoking in distant past     History of fatigue 02/2022    History of ovarian cyst     Hyperlipidemia 03/2022    diet controlled, .2 mg/dL    Vitamin D deficiency        Family History - reviewed:  Family History   Problem Relation Age of Onset    Diabetes Maternal Grandfather

## 2025-06-23 ENCOUNTER — OFFICE VISIT (OUTPATIENT)
Age: 25
End: 2025-06-23
Payer: COMMERCIAL

## 2025-06-23 VITALS
BODY MASS INDEX: 28.65 KG/M2 | WEIGHT: 171.96 LBS | TEMPERATURE: 98 F | HEIGHT: 65 IN | HEART RATE: 84 BPM | SYSTOLIC BLOOD PRESSURE: 109 MMHG | RESPIRATION RATE: 17 BRPM | DIASTOLIC BLOOD PRESSURE: 74 MMHG | OXYGEN SATURATION: 100 %

## 2025-06-23 DIAGNOSIS — E55.9 VITAMIN D DEFICIENCY: ICD-10-CM

## 2025-06-23 DIAGNOSIS — D50.9 IRON DEFICIENCY ANEMIA, UNSPECIFIED IRON DEFICIENCY ANEMIA TYPE: ICD-10-CM

## 2025-06-23 DIAGNOSIS — Z00.00 ANNUAL PHYSICAL EXAM: Primary | ICD-10-CM

## 2025-06-23 DIAGNOSIS — Z00.00 ANNUAL PHYSICAL EXAM: ICD-10-CM

## 2025-06-23 DIAGNOSIS — E78.2 MIXED HYPERLIPIDEMIA: ICD-10-CM

## 2025-06-23 PROCEDURE — 99395 PREV VISIT EST AGE 18-39: CPT | Performed by: STUDENT IN AN ORGANIZED HEALTH CARE EDUCATION/TRAINING PROGRAM

## 2025-06-23 SDOH — ECONOMIC STABILITY: FOOD INSECURITY: WITHIN THE PAST 12 MONTHS, YOU WORRIED THAT YOUR FOOD WOULD RUN OUT BEFORE YOU GOT MONEY TO BUY MORE.: NEVER TRUE

## 2025-06-23 SDOH — ECONOMIC STABILITY: FOOD INSECURITY: WITHIN THE PAST 12 MONTHS, THE FOOD YOU BOUGHT JUST DIDN'T LAST AND YOU DIDN'T HAVE MONEY TO GET MORE.: NEVER TRUE

## 2025-06-23 ASSESSMENT — PATIENT HEALTH QUESTIONNAIRE - PHQ9
7. TROUBLE CONCENTRATING ON THINGS, SUCH AS READING THE NEWSPAPER OR WATCHING TELEVISION: NOT AT ALL
4. FEELING TIRED OR HAVING LITTLE ENERGY: NOT AT ALL
SUM OF ALL RESPONSES TO PHQ QUESTIONS 1-9: 0
10. IF YOU CHECKED OFF ANY PROBLEMS, HOW DIFFICULT HAVE THESE PROBLEMS MADE IT FOR YOU TO DO YOUR WORK, TAKE CARE OF THINGS AT HOME, OR GET ALONG WITH OTHER PEOPLE: NOT DIFFICULT AT ALL
8. MOVING OR SPEAKING SO SLOWLY THAT OTHER PEOPLE COULD HAVE NOTICED. OR THE OPPOSITE, BEING SO FIGETY OR RESTLESS THAT YOU HAVE BEEN MOVING AROUND A LOT MORE THAN USUAL: NOT AT ALL
2. FEELING DOWN, DEPRESSED OR HOPELESS: NOT AT ALL
SUM OF ALL RESPONSES TO PHQ QUESTIONS 1-9: 0
5. POOR APPETITE OR OVEREATING: NOT AT ALL
1. LITTLE INTEREST OR PLEASURE IN DOING THINGS: NOT AT ALL
9. THOUGHTS THAT YOU WOULD BE BETTER OFF DEAD, OR OF HURTING YOURSELF: NOT AT ALL
6. FEELING BAD ABOUT YOURSELF - OR THAT YOU ARE A FAILURE OR HAVE LET YOURSELF OR YOUR FAMILY DOWN: NOT AT ALL
SUM OF ALL RESPONSES TO PHQ QUESTIONS 1-9: 0
3. TROUBLE FALLING OR STAYING ASLEEP: NOT AT ALL
SUM OF ALL RESPONSES TO PHQ QUESTIONS 1-9: 0

## 2025-06-23 NOTE — PROGRESS NOTES
Chief Complaint   Patient presents with    Annual Exam     \"Have you been to the ER, urgent care clinic since your last visit?  Hospitalized since your last visit?\"    NO    “Have you seen or consulted any other health care providers outside of [facility] since your last visit?”      [facility]         “Have you had a pap smear?”      Yes  GYN [facility]      Vitals:    25 1118   BP: 109/74   Pulse: 84   Resp: 17   Temp: 98 °F (36.7 °C)   TempSrc: Temporal   SpO2: 100%   Weight: 78 kg (171 lb 15.3 oz)   Height: 1.651 m (5' 5\")      Health Maintenance Due   Topic Date Due    Pap smear  Never done    COVID-19 Vaccine ( season) 2024    Depression Monitoring  01/10/2025      The patient, Catarina Rendon, identity was verified by name and , pharmacy verified  Labs:Yes  Fasting:Yes

## 2025-06-23 NOTE — PROGRESS NOTES
[facility]  [address] [place] [address] [phone].    C/C: Annual Physical    HPI:    Catarina Rendon is a 24 y.o.  White (non-)  adult who presents to clinic today for annual physical    PMHx: Depression/Anxiety, migraine, and hypersomnia. Here for follow up    Subjective;    No acute concerns    Depression/anxiety:  On  Lamictal  Follows with psych    Hypersomnia - follows with Sleep medicine, on Modafinil 200 mg daily    Of note, pt is scheduled to see ENT for possible ballon rhinoplasty due to anatomical   Pt denies any fever, chill, chest pain, SOB, abdominal pain, n/v/d, HA or dizziness.     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  Ophthalmologist  Dentist  Therapist    Allergies- reviewed:   No Known Allergies    Past Medical History- reviewed:  Past Medical History:   Diagnosis Date    Depression     02/2022: therapy    Fibromyalgia 02/2022    Former smoker, stopped smoking in distant past     History of fatigue 02/2022    History of ovarian cyst     Hyperlipidemia 03/2022    diet controlled, .2 mg/dL    Vitamin D deficiency        Family History - reviewed:  Family History   Problem Relation Age of Onset    Diabetes Maternal Grandfather     Hypertension Mother     Sleep Apnea Father        Social History - reviewed:  Social History     Socioeconomic History    Marital status:      Spouse name: Not on file    Number of children: Not on file    Years of education: Not on file    Highest education level: Not on file   Occupational History    Not on file   Tobacco Use    Smoking status: Former     Passive exposure: Never    Smokeless tobacco: Former   Vaping Use    Vaping status: Former    Substances: Nicotine, THC, CBD, Flavoring

## 2025-06-24 LAB
25(OH)D3 SERPL-MCNC: 26.2 NG/ML (ref 30–100)
ALBUMIN SERPL-MCNC: 3.9 G/DL (ref 3.5–5)
ALBUMIN/GLOB SERPL: 1.6 (ref 1.1–2.2)
ALP SERPL-CCNC: 83 U/L (ref 45–117)
ALT SERPL-CCNC: 21 U/L (ref 12–78)
ANION GAP SERPL CALC-SCNC: 7 MMOL/L (ref 2–12)
AST SERPL-CCNC: 22 U/L (ref 15–37)
BILIRUB SERPL-MCNC: 0.5 MG/DL (ref 0.2–1)
BUN SERPL-MCNC: 9 MG/DL (ref 6–20)
BUN/CREAT SERPL: 12 (ref 12–20)
CALCIUM SERPL-MCNC: 8.7 MG/DL (ref 8.5–10.1)
CHLORIDE SERPL-SCNC: 105 MMOL/L (ref 97–108)
CHOLEST SERPL-MCNC: 187 MG/DL
CO2 SERPL-SCNC: 26 MMOL/L (ref 21–32)
CREAT SERPL-MCNC: 0.75 MG/DL (ref 0.55–1.02)
ERYTHROCYTE [DISTWIDTH] IN BLOOD BY AUTOMATED COUNT: 13.2 % (ref 11.5–14.5)
GLOBULIN SER CALC-MCNC: 2.5 G/DL (ref 2–4)
GLUCOSE SERPL-MCNC: 81 MG/DL (ref 65–100)
HCT VFR BLD AUTO: 35.6 % (ref 35–47)
HDLC SERPL-MCNC: 74 MG/DL
HDLC SERPL: 2.5 (ref 0–5)
HGB BLD-MCNC: 11.3 G/DL (ref 11.5–16)
LDLC SERPL CALC-MCNC: 101.2 MG/DL (ref 0–100)
MCH RBC QN AUTO: 25.9 PG (ref 26–34)
MCHC RBC AUTO-ENTMCNC: 31.7 G/DL (ref 30–36.5)
MCV RBC AUTO: 81.7 FL (ref 80–99)
NRBC # BLD: 0 K/UL (ref 0–0.01)
NRBC BLD-RTO: 0 PER 100 WBC
PLATELET # BLD AUTO: 360 K/UL (ref 150–400)
PMV BLD AUTO: 10.1 FL (ref 8.9–12.9)
POTASSIUM SERPL-SCNC: 4.2 MMOL/L (ref 3.5–5.1)
PROT SERPL-MCNC: 6.4 G/DL (ref 6.4–8.2)
RBC # BLD AUTO: 4.36 M/UL (ref 3.8–5.2)
SODIUM SERPL-SCNC: 138 MMOL/L (ref 136–145)
TRIGL SERPL-MCNC: 59 MG/DL
VLDLC SERPL CALC-MCNC: 11.8 MG/DL
WBC # BLD AUTO: 5.5 K/UL (ref 3.6–11)

## 2025-07-06 ENCOUNTER — RESULTS FOLLOW-UP (OUTPATIENT)
Age: 25
End: 2025-07-06